# Patient Record
Sex: FEMALE | Race: OTHER
[De-identification: names, ages, dates, MRNs, and addresses within clinical notes are randomized per-mention and may not be internally consistent; named-entity substitution may affect disease eponyms.]

---

## 2017-03-07 ENCOUNTER — HOSPITAL ENCOUNTER (INPATIENT)
Dept: HOSPITAL 61 - ER | Age: 72
LOS: 3 days | Discharge: HOME | DRG: 189 | End: 2017-03-10
Attending: FAMILY MEDICINE | Admitting: FAMILY MEDICINE
Payer: MEDICARE

## 2017-03-07 VITALS — SYSTOLIC BLOOD PRESSURE: 165 MMHG | DIASTOLIC BLOOD PRESSURE: 77 MMHG

## 2017-03-07 VITALS — SYSTOLIC BLOOD PRESSURE: 156 MMHG | DIASTOLIC BLOOD PRESSURE: 85 MMHG

## 2017-03-07 VITALS — HEIGHT: 59 IN | BODY MASS INDEX: 38.91 KG/M2 | WEIGHT: 193 LBS

## 2017-03-07 VITALS — DIASTOLIC BLOOD PRESSURE: 68 MMHG | SYSTOLIC BLOOD PRESSURE: 140 MMHG

## 2017-03-07 DIAGNOSIS — Z90.49: ICD-10-CM

## 2017-03-07 DIAGNOSIS — J20.9: ICD-10-CM

## 2017-03-07 DIAGNOSIS — E11.65: ICD-10-CM

## 2017-03-07 DIAGNOSIS — K21.9: ICD-10-CM

## 2017-03-07 DIAGNOSIS — Z79.899: ICD-10-CM

## 2017-03-07 DIAGNOSIS — J96.01: Primary | ICD-10-CM

## 2017-03-07 DIAGNOSIS — Z79.82: ICD-10-CM

## 2017-03-07 DIAGNOSIS — E66.01: ICD-10-CM

## 2017-03-07 DIAGNOSIS — J45.901: ICD-10-CM

## 2017-03-07 DIAGNOSIS — E03.9: ICD-10-CM

## 2017-03-07 DIAGNOSIS — Z88.0: ICD-10-CM

## 2017-03-07 DIAGNOSIS — Z87.891: ICD-10-CM

## 2017-03-07 DIAGNOSIS — I10: ICD-10-CM

## 2017-03-07 DIAGNOSIS — Z88.8: ICD-10-CM

## 2017-03-07 DIAGNOSIS — G47.33: ICD-10-CM

## 2017-03-07 DIAGNOSIS — I70.0: ICD-10-CM

## 2017-03-07 LAB
ALBUMIN SERPL-MCNC: 3.5 G/DL (ref 3.4–5)
ALBUMIN/GLOB SERPL: 0.7 {RATIO} (ref 1–1.7)
ALP SERPL-CCNC: 98 U/L (ref 46–116)
ALT SERPL-CCNC: 33 U/L (ref 14–59)
ANION GAP SERPL CALC-SCNC: 14 MMOL/L (ref 6–14)
AST SERPL-CCNC: 25 U/L (ref 15–37)
BACTERIA #/AREA URNS HPF: (no result) /HPF
BASOPHILS # BLD AUTO: 0 X10^3/UL (ref 0–0.2)
BASOPHILS NFR BLD: 0 % (ref 0–3)
BILIRUB SERPL-MCNC: 0.3 MG/DL (ref 0.2–1)
BILIRUB UR QL STRIP: NEGATIVE
BUN SERPL-MCNC: 17 MG/DL (ref 7–20)
BUN/CREAT SERPL: 21 (ref 6–20)
CALCIUM SERPL-MCNC: 9.2 MG/DL (ref 8.5–10.1)
CHLORIDE SERPL-SCNC: 104 MMOL/L (ref 98–107)
CK SERPL-CCNC: 98 U/L (ref 26–192)
CKMB INDEX: 1.3 % (ref 0–4)
CKMB MASS: 1.3 NG/ML (ref 0–3.6)
CO2 SERPL-SCNC: 23 MMOL/L (ref 21–32)
CREAT SERPL-MCNC: 0.8 MG/DL (ref 0.6–1)
EOSINOPHIL NFR BLD: 1 % (ref 0–3)
ERYTHROCYTE [DISTWIDTH] IN BLOOD BY AUTOMATED COUNT: 14.3 % (ref 11.5–14.5)
GFR SERPLBLD BASED ON 1.73 SQ M-ARVRAT: 70.7 ML/MIN
GLOBULIN SER-MCNC: 4.7 G/DL (ref 2.2–3.8)
GLUCOSE SERPL-MCNC: 193 MG/DL (ref 70–99)
GLUCOSE UR STRIP-MCNC: NEGATIVE MG/DL
HCT VFR BLD CALC: 40.9 % (ref 36–47)
HGB BLD-MCNC: 13.3 G/DL (ref 12–15.5)
LYMPHOCYTES # BLD: 1.2 X10^3/UL (ref 1–4.8)
LYMPHOCYTES NFR BLD AUTO: 15 % (ref 24–48)
MCH RBC QN AUTO: 27 PG (ref 25–35)
MCHC RBC AUTO-ENTMCNC: 33 G/DL (ref 31–37)
MCV RBC AUTO: 84 FL (ref 79–100)
MONOCYTES NFR BLD: 3 % (ref 0–9)
NEUTROPHILS NFR BLD AUTO: 80 % (ref 31–73)
NITRITE UR QL STRIP: NEGATIVE
OBC FLU: (no result)
PH UR STRIP: 5.5 [PH]
PLATELET # BLD AUTO: 195 X10^3/UL (ref 140–400)
POTASSIUM SERPL-SCNC: 4.4 MMOL/L (ref 3.5–5.1)
PROT SERPL-MCNC: 8.2 G/DL (ref 6.4–8.2)
PROT UR STRIP-MCNC: 100 MG/DL
RBC # BLD AUTO: 4.87 X10^6/UL (ref 3.5–5.4)
RBC #/AREA URNS HPF: (no result) /HPF (ref 0–2)
SODIUM SERPL-SCNC: 141 MMOL/L (ref 136–145)
SP GR UR STRIP: 1.01
SQUAMOUS #/AREA URNS LPF: (no result) /LPF
UROBILINOGEN UR-MCNC: 0.2 MG/DL
WBC # BLD AUTO: 7.9 X10^3/UL (ref 4–11)
WBC #/AREA URNS HPF: (no result) /HPF (ref 0–4)

## 2017-03-07 PROCEDURE — 83880 ASSAY OF NATRIURETIC PEPTIDE: CPT

## 2017-03-07 PROCEDURE — 36415 COLL VENOUS BLD VENIPUNCTURE: CPT

## 2017-03-07 PROCEDURE — 96374 THER/PROPH/DIAG INJ IV PUSH: CPT

## 2017-03-07 PROCEDURE — 83036 HEMOGLOBIN GLYCOSYLATED A1C: CPT

## 2017-03-07 PROCEDURE — 84484 ASSAY OF TROPONIN QUANT: CPT

## 2017-03-07 PROCEDURE — 85027 COMPLETE CBC AUTOMATED: CPT

## 2017-03-07 PROCEDURE — 80053 COMPREHEN METABOLIC PANEL: CPT

## 2017-03-07 PROCEDURE — 82947 ASSAY GLUCOSE BLOOD QUANT: CPT

## 2017-03-07 PROCEDURE — 94640 AIRWAY INHALATION TREATMENT: CPT

## 2017-03-07 PROCEDURE — 93005 ELECTROCARDIOGRAM TRACING: CPT

## 2017-03-07 PROCEDURE — 81001 URINALYSIS AUTO W/SCOPE: CPT

## 2017-03-07 PROCEDURE — 82553 CREATINE MB FRACTION: CPT

## 2017-03-07 PROCEDURE — 87804 INFLUENZA ASSAY W/OPTIC: CPT

## 2017-03-07 PROCEDURE — 71020: CPT

## 2017-03-07 PROCEDURE — 80048 BASIC METABOLIC PNL TOTAL CA: CPT

## 2017-03-07 PROCEDURE — 94760 N-INVAS EAR/PLS OXIMETRY 1: CPT

## 2017-03-07 PROCEDURE — 94250: CPT

## 2017-03-07 RX ADMIN — AMLODIPINE BESYLATE SCH MG: 5 TABLET ORAL at 21:00

## 2017-03-07 RX ADMIN — INSULIN ASPART SCH UNITS: 100 INJECTION, SOLUTION INTRAVENOUS; SUBCUTANEOUS at 18:37

## 2017-03-07 RX ADMIN — METHYLPREDNISOLONE SODIUM SUCCINATE SCH MG: 40 INJECTION, POWDER, FOR SOLUTION INTRAMUSCULAR; INTRAVENOUS at 18:03

## 2017-03-07 RX ADMIN — LISINOPRIL SCH MG: 20 TABLET ORAL at 21:00

## 2017-03-07 RX ADMIN — BACITRACIN SCH MLS/HR: 5000 INJECTION, POWDER, FOR SOLUTION INTRAMUSCULAR at 18:03

## 2017-03-07 RX ADMIN — IPRATROPIUM BROMIDE AND ALBUTEROL SULFATE SCH ML: .5; 3 SOLUTION RESPIRATORY (INHALATION) at 20:29

## 2017-03-07 NOTE — EKG
[facility]

              [address] [place] [address]

Test Date:    2017               Test Time:    12:48:59

Pat Name:     MINI MAHARAJ            Department:   

Patient ID:   [id number]           Room:          

Gender:       F                        Technician:   

:          1945               Requested By: LYNNETTE CEDILLO

Order Number: [id number]            Reading MD:     

                                 Measurements

Intervals                              Axis          

Rate:         85                       P:            49

NY:           150                      QRS:          74

QRSD:         104                      T:            58

QT:           364                                    

QTc:          439                                    

                           Interpretive Statements

SINUS RHYTHM

QRS(T) CONTOUR ABNORMALITY

CONSIDER ANTEROLATERAL MYOCARDIAL DAMAGE

RI6.01          Unconfirmed report

No previous ECG available for comparison

## 2017-03-07 NOTE — [facility]
Admission Forms Criteria


                  GENERAL ADMISSION CRITERIA


 


                                                                               

   (Place 'X' for any and all applicable criteria):





Admission is indicated for ANY ONE of the following:





[ ]I.     Hemodynamic instability as indicated by ANY ONE of the following(1)(2)

(3)(4)(5):


          [ ]a) Vital sign abnormality not readily corrected by appropriate 

treatment within 12 to 24 hours indicated by ANY ONE of the following: 


                 [ ]i)    Hypotension


                 [ ]ii)   Symptomatic Tachycardia unresponsive to treatment (eg

, analgesia, fluids, sedation as indicated)


                 [ ]iii)  Orthostatic vital sign changes unresponsive to 

treatment (eg, fluids)


          [ ]b) Vital sign abnormality that is severe indicated by ANY ONE of 

the following:


                 [ ]i)    Inadequate perfusion indicated by ANY ONE of the 

following:


                          [ ]1)   Lactic acidosis (greater than 2 mmol/L)


                          [ ]2)   New abnormal capillary refill (greater than 3 

seconds)


                          [ ]3)   Other metabolic acidosis (arterial pH less 

than 7.35) not otherwise explained


                          [ ]4)   Reduced urine output


                          [ ]5)   Altered mental status


                          [ ]6)   Myocardial Ischemia


                 [ ]v)    Mean arterial pressure[A] less than 60 mm Hg


                 [ ]vi)   Mean arterial pressure[A] less than 70 mm Hg after 30 

minutes of appropriate treatment (eg, fluid resuscitation)


                 [ ]vii)  IV inotropic or vasopressor medication required to 

maintain adequate blood pressure or perfusion


                 [ ]viii) Sustained heart rate greater than 120 beats per 

minute in adult or child 6 years or older[B]]


[ ]II.    Hypertension requiring inpatient treatment as indicated by ANY ONE of 

the following(6)(7)(8):


                 [ ]a)  SBP greater than 220 mm Hg or DBP greater than 120 mm 

Hg despite treatment


                 [ ]b)  SBP greater than 140 mm Hg or DBP greater than 100 mm 

Hg with evidence of acute end organ 


                         damage as indicated by ANY ONE of the following:


                         [ ]i)    Encephalopathy


                         [ ]ii)   Acute renal failure as indicated by new onset 

of ANY ONE of the following(9)(10)(11)(12)(13):


                                  [ ]1)  A 3-fold rise in serum creatinine from 

baseline


                                  [ ]2) Serum creatinine greater than 4 mg/dL (

354 micromoles/L) with acute rise greater than 0.5 mg/dL (44.2 micromoles/L)


                                  [ ]3)  Reduction of more than 75% in 

estimated glomerular filtration rate from baseline


                                  [ ]4) Estimated glomerular filtration rate 

less than 35 mL/min/1.73m2 (0.59 mL/sec/1.73m2) in child up to 18 years of age


                                  [ ]5) Cessation of urine output indicated by 

ALL of the following: 


                                        [ ]A.   Adequate volume status


                                        [ ]B.   Inadequate urine output as 

indicated by ANY ONE of the following:


                                                [ ]a.     Urine output less 

than 0.3 mL/kg/hr for 24 hours


                                                [ ]b.     Anuria (urine output 

less than 0.1 mL/kg/hr) for 12 hours 


                        [ ]iii)  Aortic dissection


                        [ ]iv)  Myocardial ischemia


                        [ ]v)   Left ventricular heart failure 


                        [ ]vi)  Retinal hemorrhage


                        [ ]vii) Other significant finding 


                 [ ]c)  Hypertension in child requiring inpatient treatment as 

indicated by ALL of the following(14)(15)(16):


                        [ ]i)    Outpatient treatment not effective, not 

available, or not appropriate 


                        [ ]ii)   SBP or DBP greater than 95th percentile for age


                        [ ]iii)  Evidence of acute end organ damage as 

indicated by ANY ONE of the following:


                                 [ ]1)   Altered mental status


                                 [ ]2)   Acute renal failure as indicated by 

new onset of ANY ONE of the following(9)(10)(11)(12)(13):


                                        [ ]A.    A 3-fold rise in serum 

creatinine from baseline


                                        [ ]B.    Serum creatinine greater than 

4 mg/dL (354 micromoles/L) with acute rise greater than 0.5 mg/dL (44.2 

micromoles/L)


                                        [ ]C.    Reduction of more than 75% in 

estimated glomerular filtration rate from baseline


                                        [ ]D.    Estimated glomerular 

filtration rate less than 35 mL/min/1.73m2 (0.59 mL/sec/1.73m2)in child up to 

18 years of age


                                        [ ]E.    Cessation of urine output 

indicated by ALL of the following:


                                                  [ ]a.   Adequate volume status


                                                  [ ]b.   Inadequate urine 

output as indicated by ANY ONE of the following:


                                                           [ ]1)  Urine output 

less than 0.3 mL/kg/hr for 24 hours 


                                                           [ ]2)  Anuria (urine 

output less than 0.1 mL/kg/hr) for 12 hours


                                                           [ ]3)  Severe 

headache


                                                           [ ]4)  Visual 

disturbance


                                                           [ ]5)  Retinal 

hemorrhage


                                                           [ ]6)  Other 

significant finding


[ ]III.   Acute cardiac or peripheral ischemia as indicated by ANY ONE of the 

following:


          [ ]a)  Acute coronary syndrome(17)(18)


          [ ]b)  Acute peripheral ischemia (eg, pulseless, cool, mottled, or 

cyanotic extremity)(19) 


[ ]IV.     Cardiac arrhythmias or findings of immediate concern indicated by 

ANY ONE of the following(20)(21):


           [ ]a)  Heart rhythms that are inherently dangerous or unstable 

indicated by ANY ONE of the following(22)(23)(24):


                  [ ]i)    Resuscitated ventricular fibrillation or cardiac 

arrest 


                  [ ]ii)   Ventricular escape rhythm


                  [ ]iii)  Sustained ventricular tachycardia (30 seconds or 

more of ventricular rhythm at greater than 100 beats per minute)


                  [ ]iv)  Nonsustained ventricular tachycardia and ANY ONE of 

the following:


                          [ ]1)   Suspected cardiac ischemia as cause or 

consequence of ventricular tachycardia


                          [ ]2)   In setting of acute myocarditis


           [ ]b)  Unstable cardiac conduction defects indicated by ANY ONE of 

the following(24)(25)(26):


                  [ ]i)    Type II second-degree atrioventricular block 


                  [ ]ii)   Third-degree atrioventricular block


                  [ ]iii)  New-onset left bundle branch block with suspected 

myocardial ischemia


           [ ]c)  Any heart rhythm and ANY ONE of the following(22)(23)(27)(28)(

29):


                  [ ] i)    Continuous long-term ECG monitoring needed (eg, 

initiation of drug requiring monitoring for more than 24 hours)


                  [ ] ii)   Patient has automatic implanted cardioverter 

defibrillator that is repeatedly firing, malfunctioning, 


                            or in need of immediate adjustment of settings 

beyond the scope of ambulatory or observation care. 


           [ ]d)  Heart rhythms of concern due to ANY ONE of the following:


                  [ ]i)    Hypotension


                  [ ]ii)   Respiratory distress


                  [ ]iii)  Association with other significant symptoms (eg, 

bradycardia with syncope or ongoing dizziness, 


                          supraventricular tachycardia with chest pain) (27)(28)

(30)


[ ] V.          Severe heart failure as indicated by ANY ONE of the following (

31)(32):


            [ ]a)  Respiratory distress 


            [ ]b)  Hypotension


            [ ]c)  Anasarca (refractory to outpatient therapy) 


            [ ]d)  Cardiac arrhythmias of immediate concern 


            [ ]e)  Myocardial ischemia


[X]VI.     Respiratory abnormalities, including ANY ONE of the following(33)(34)

(35)(36):


           [ ]a)  Respiratory rate greater than 30 breaths per minute 

unresponsive to treatment [A]


           [X]b)  New saturation of arterial oxygen less than 90%


           [ ]c)  New partial pressure of carbon dioxide greater than 44 mm Hg (

5.9 kPa)


           [ ]d)  Supplemental oxygen or respiratory treatments needed that are 

new or not performable at other levels of care


           [ ]e)  New-onset cyanosis


           [ ]f)   Inability to protect airway


           [ ]g)  Chronic lung disease with severe deterioration (not 

responsive to emergency and observation care treatment as 


                   appropriate) as indicated by ANY ONE of the  following(34)(36

):


                     [ ]i)    SaO2 5% below baseline in patient with chronic 

hypoxemia


                     [ ]ii)   New requirement for supplemental oxygen to keep 

SaO2 at baseline or acceptable level


                     [ ]iii)  Required supplemental oxygen performable only in 

acute inpatient setting


                     [ ]iv)   Severe airflow or ventilation abnormalities


                     [ ]v)    Previously mobile patient unable to walk between 

rooms 


                     [ ]vi    Inability to eat or sleep due to dyspnea


                     [ ]vii)  Rapid rate of exacerbation onset 


                     [ ]viii) Altered mental status


 ]VII.  Severe airflow or ventilation abnormalities (not responsive to 

emergency and observation care treatment as appropriate) as 


         indicated by ANY  ONE of the following(33)(34)(35)(37):


          [ ]a)  PCO2 greater than 42 mm Hg (5.6 kPa) and pH less than 7.35 (new

)


          [ ]b)  Documented PCO2 increased more than 5 mm Hg (0.7 kPa) from 

disease baseline


          [ ]c)  Airflow measurements [B] less than 60% of previous best or 

predicted (eg, peak expiratory flow rate less than 300 L/minute)


                  despite intensive emergent treatment [C]


          [ ]d)  Required respiratory treatments that are performable only in 

acute inpatient setting


[ ]VIII.  Impending or actual respiratory arrest  ( Also use Respiratory 

Failure GRG  for severe respiratory disease and


          long-term mechanical ventilation patients)


[ ]IX.    Neurologic abnormalities, including ANY ONE of the following:


          [ ]a)  New findings that suggest ANY ONE of the following:


                 [ ]i)    CNS infection(38)


                 [ ]ii)   Cerebral bleeding, ischemia, or vasospasm(39)(40)


                 [ ]iii)  Increased intracranial pressure, hydrocephalus, or 

cerebral edema(41)(42)(43)


                 [ ]iv)  Spinal cord injury(44)


         [ ]b)  Uncontrolled seizures(45)


         [ ]c)  New-onset coma (eg, [place] coma scale score less than 9) or 

unexplained abnormal mental status 


                (eg, [place] coma scale score less than 14) [D](41)(46)(47)


[ ]X.   New-onset severe neurologic findings requiring inpatient care; examples 

include(42)(48)(49):


         [ ]a)  Papilledema


         [ ]b)  Cerebral edema


         [ ]c)  Mass effect on CT scan


[ ]XI.    Suspected acute intra-abdominal process with peritoneal signs, 

abdominal mass, or similar findings (50)(51)(52)


[ ]XII.   Severe physiologic disorder remaining after emergency or observation 

level care (as appropriate) as indicated by 


         ANY ONE of the following (53):


         [ ]a)  Significant dehydration


         [ ]b)  Diabetic ketoacidosis


         [ ]c)  Hyperglycemic hyperosmolar state (eg, osmolality greater than 

320 mOsm/kg (mmol/kg)


         [ ]d)  Hypoglycemia


         [ ]e)  Other (new) acid-base disorder with pH less than 7.35 or 

greater than 7.5(54)


         [ ]f)  Thyroid storm (55)


         [ ]g)  Myxedema coma (55)


[ ]XIII.  Abdominal abnormalities with ANY ONE of the following(56)(57):


         [ ]a)  Absent bowel sounds with complete ileus


         [ ]b)  Signs of intestinal obstruction or peritonitis [E]


         [ ]c)  Nausea and vomiting that cannot be controlled with outpatient 

or observation care


[ ]XIV. Acute renal failure as indicated by new onset of ANY ONE of the 

following(9)(10)(11)(12)(13):


          [ ]a)  A 3-fold rise in serum creatinine from baseline


          [ ]b)  Serum creatinine greater than 4 mg/dL (354 micromoles/L) with 

acute rise greater than 0.5 mg/dL (44.2 micromoles/L)


          [ ]c)  Reduction of more than 75% in estimated glomerular filtration 

rate from baseline


          [ ]d)  Estimated glomerular filtration rate less than 35 mL/min/

1.73m2 (0.59 mL/sec/1.73m2) in child up to 18 years of age


          [ ]e)  Cessation of urine output indicated by ALL of the following:


                 [ ]i)    Adequate volume status


                 [ ]ii)   Inadequate urine output as indicated by ANY ONE of 

the following:


                         [ ]1)   Urine output less than 0.3 mL/kg/hr for 24 

hours


                         [ ]2)   Anuria (urine output less than 0.1 mL/kg/hr) 

for 12 hours


[ ]XV.  Significant uremic complications as indicated by ANY ONE of the 

following(58)(59)(60):


          [ ]a)  Outpatient therapy is ineffective or not feasible for ANY ONE 

of the following:


                 [ ]i)    Severe heart failure 


                 [ ]ii)   Severehypertension 


                 [ ]iii)  Pleural effusion


                 [ ]iv)  Pericarditis or pericardial effusion


           [ ]b)  Cardiac arrhythmias of immediate concern     


           [ ]c)  Intractable nausea or vomiting


           [ ]d)  Recurrent seizures 


           [ ]e)  Encephalopathy


           [ ]f)   Bleeding abnormalities (eg, platelet dysfunction) with 

active (eg, gastrointestinal) bleeding 


           [ ]g)  Dialysis indicated before long-term access or ambulatory 

arrangements can be made


           [ ]h)  Significant metabolic or electrolyte abnormalities (eg, 

severe acidosis or hyperkalemia)


[ ]XVI.  High fever or other high-risk infection situation as indicated by ANY 

ONE of the following(61)(62)(63)(64):


           [ ]a)  Outpatient and observation care antimicrobial treatment 

unavailable, not effective, or not appropriate 


           [ ]b)  Documented bacteremia


           [ ]c)  Temperature greater than 40.5 degrees C (104.9 degrees F) (

oral)


           [ ]d)  Temperature greater than 39.5 degrees C (103.1 degrees F) (

oral) or less than 36 degrees C (96.8 degrees F)


                   (rectal) that does not respond to e  treatment and 

observation care


[ ] XVII.  Temperature less than 95 degrees F (35 degrees C)(rectal)(65)


[ ] XVIII. Severe nutritional abnormalities as indicated by ALL of the following

(66)(67):


           [ ]a)  Inability to tolerate or establish sufficient oral or other 

enteral nutrition in outpatient setting 


           [ ]b)  Parenteral nutrition regimen need that must be implemented on 

inpatient basis


[ ] XIX.  Severe electrolyte abnormalities indicated by ALL of the following(68)

(69)(70):


           [ ]a)  Electrolytes and associated findings are not as expected for 

patient baseline or acceptable treatment effects.


           [ ]b)  Severe abnormalities indicated by ANY ONE of the following:


                  [ ]i)  Sodium less than 130 mEq/L (mmol/L) (new)


                  [ ]ii)Sodium less than 135 mEq/L (mmol/L) with ANY ONE of the 

following:


                        [ ]1)   Uncorrectable (to near normal or chronic 

baseline) after trial of outpatient and emergency treatment


                        [ ]2)   Altered mental status


                        [ ]3)   Seizures


                        [ ]4)   Severe medical etiology requiring inpatient 

management (eg, heart failure, hypovolemia)                 


                  [ ]iii)  Sodium greater than 155 mEq/L (mmol/L)


                  [ ]iv)  Sodium greater than 150 mEq/L (mmol/L) with ANY ONE 

of the following:


                        [ ]1)   Uncorrectable (to near normal or chronic 

baseline) with outpatient and emergency treatment


                        [ ]2)   Altered mental status


                        [ ]3)   Seizures


                        [ ]4)   Severe medical etiology (eg, hypovolemia, 

diabetes insipidus)


                  [ ]v)   Potassium less than 2.5 mEq/L (mmol/L) despite 

outpatient and emergency treatment 


                  [ ]vi)  Potassium less than 3 mEq/L (mmol/L) with ANY ONE of 

the following:


                         [ ]1)   Weakness


                         [ ]2)   Cardiac abnormality (eg, arrhythmia, 

conduction disturbance)


                         [ ]3)   Cardiac ischemia


                         [ ]4)   Ileus


                         [ ]5)   Ongoing medical cause requiring inpatient 

management (eg, acute renal wasting or SIADH)


                         [ ]6)   Other severe symptoms                 


                 [ ]vii) Potassium greater than 6.5 mEq/L (mmol/L)


                 [ ]viii) Potassium greater than 5 mEq/L (mmol/L) with ANY ONE 

of the following:


                        [ ]1)   Uncorrectable (to near normal or chronic 

baseline) with outpatient and emergency treatment


                        [ ]2)   Severe ECG findings [F]


                        [ ]3)   Acute worsening of renal failure (creatinine 

greater than 2.5 mg/dL (221 micromoles/L) or significant elevation for age and 

size)


                        [ ]4)   Severe weakness


                        [ ]5)   Severe medical etiology (eg, hemolysis, 

infection, drug overdose)


                 [ ]ix)  Calcium less than 7 mg/dL (1.75 mmol/L) despite 

outpatient and emergency treatment (72)


                 [ ]x)  Calcium less than 8 mg/dL (2 mmol/L) with significant 

symptoms or findings; examples include(72):                       


                         [ ]1)   Altered mental status


                         [ ]2)   Muscle spasms


                         [ ]3)   Seizures


                         [ ]4)   Breathing difficulty


                         [ ]5)   Cardiac abnormality (eg, arrhythmia or 

conduction disturbance)


                [ ]xi)  Calcium greater than 14 mg/dL (3.5 mmol/L)(72)


                [ ]xii) Calcium greater than 12 mg/dL (3 mmol/L) with ANY ONE 

of the following(72):


                         [ ]1)   Uncorrectable (to near normal or chronic 

baseline) with outpatient and emergency treatment


                         [ ]2)   Significant dehydration or hypovolemia as 

indicated by ALL of the following(70)(73)(74):


                                  [ ]A.  Not resolved with initial treatments


                                  [ ]B.  Clinically significant dehydration as 

indicated by ANY ONE of the following:


                                           [ ]a. Vomiting refractory to 

outpatient treatment (ie, precluding oral rehydration)


                                           [ ]b. Inability to drink


                                           [ ]c. Hypernatremia or other 

electrolyte abnormality unable to be corrected with outpatient and emergency 

treatment


                                           [ ]d. Failure to remain hydrated 

with outpatient therapy 


                                           [ ]e. Reduced urine output


                                           [ ]f. Hypotension


                                           [ ]g. Serious cause for dehydration 

requiring acute hospitalization (eg, bowel obstruction, increased 


                                                  intracranial pressure, 

infectious cause)


                                           [ ]h. Child with ANY ONE of the 

following(75):


                                                  [ ]1)  Severe abdominal 

tenderness


                                                  [ ]2)  Adequate care not 

available at home     


                                                  [ ]3)  Severe dehydration (

greater than 9% loss of body weight)


                                                  [ ]4)  Significant symptoms 

or findings; examples include: 


                                                          [ ]A. Altered mental 

status


                                                          [ ]B. Cardiac 

abnormality (eg, arrhythmia, conduction disturbance) 


                                                          [ ]C. Malignant 

etiology requiring inpatient treatment


                [ ]xiii)  Phosphorus less than 1 mg/dL (0.32 mmol/L)


                [ ]xiv)  Phosphorus less than 1.5 mg/dL (0.48 mmol/L) with ANY 

ONE of the following:


                          [ ]1)   Patient unresponsive to outpatient and 

emergency treatment


                          [ ]2)   Significant symptoms or findings; examples 

include: 


                                  [ ]A.  Weakness


                                  [ ]B. Altered mental status 


                                  [ ]C. Breathing difficulty 


                                  [ ]D. Seizures


                                  [ ]E. Rhabdomyolysis


                [ ]xv)   Phosphorus greater than 10 mg/dL (3.2 mmol/L)


                [ ]xvi)  Phosphorus greater than 4.5 mg/dL (1.45 mmol/L) (new) 

with ANY ONE of the following:


                          [ ]1)   Severe medical etiology (eg, crush injury, 

acute renal failure)


                          [ ]2)   Associated hypocalcemia with significant 

findings; examples include: 


                                  [ ]A.    Neurologic symptoms


                                  [ ]B.    Altered mental status


                                  [ ]C.    Muscle spasms


                                  [ ]D.    Seizures


                                  [ ]E.    Breathing difficulty


                                  [ ]F.    Cardiac abnormality (eg, arrhythmia, 

conduction disturbance)


                [ ]xvii)   Magnesium less than 1 mg/dL (0.41 mmol/L)


                [ ]xviii)  Magnesium less than 1.5 mg/dL (0.62 mmol/L) with ANY 

ONE of the following:


                           [ ]1)   Patient unresponsive to outpatient and 

emergency treatment


                           [ ]2)   Associated hypocalcemia with significant 

findings; examples include: 


                                   [ ]A.    Altered mental status


                                   [ ]B.    Muscle spasms


                                   [ ]C.    Seizures


                                   [ ]D.    Breathing difficulty


                                   [ ]E.    Cardiac abnormality (eg, arrhythmia

, conduction disturbance)


                           [ ]3)   Associated hypokalemia (potassium less than 

3 mEq/L (mmol/L)) with risk of arrhythmia 


                [ ]xix)  Magnesium greater than 4 mEq/L (2 mmol/L) 


                [ ]xx)   Magnesium greater than 2.5 mEq/L (1.25 mmol/L) with 

significant symptoms or findings; examples include:


                          [ ]1)   Weakness


                          [ ]2)   Altered mental status


                          [ ]3)   Cardiac abnormality (eg, arrhythmia, 

conduction disturbance)


                          [ ]4)   Breathing difficulty


                          [ ]5)   Severe medical etiology (eg, renal failure, 

hypovolemia)


               [ ]xxi)   Uric acid greater than 20 mg/dL (1190 micromoles/L)(76)


               [ ]xxii)  Uric acid greater than 8 mg/dL (476 micromoles/L) with 

significant symptoms or findings of tumor


                          lysis syndrome; examples include(76):


                          [ ]1)   Creatinine greater than 1.5 times upper limit 

of normal


                          [ ]2)   Cardiac abnormality (eg, arrhythmia, 

conduction disturbance)


                          [ ]3)   Seizure


[ ]XX.   Acute blood loss causing significant abnormality as indicated by ANY 

ONE of the following(77)(78):


         [ ]a)  Hemoglobin less than 10 g/dL (100 g/L) (not baseline)


         [ ]b)  Hematocrit less than 30% (0.30) (not baseline)


         [ ]c)  Repeat hematocrit decreased more than 2% (0.02)


         [ ]d)  Uncontrolled bleeding


[ ]XXI. Severe anemia indicated by ANY ONE of the following(78)(79):


         [ ]a)  Altered mental status 


         [ ]b)  Chest pain


         [ ]c)  Exertional dyspnea 


         [ ]d)  Syncope


         [ ]e)  Other findings suggesting inadequate perfusion


         [ ]f)   Treatment with transfusion or volume replacement is 

ineffective at resolving ANY ONE of the following [G]:


                 [ ]i)    Tachycardia for age


                 [ ]ii)   Orthostatic vital sign changes as indicated by ANY ONE

 of the following(80):


                         [ ]1)    Fall in SBP of 20 mm Hg or more 1 to 3 

minutes after patient sits or stands from recumbent position


                         [ ]2)    Fall in DBP of 10 mm Hg or more 1 to 3 

minutes after patient sits or stands from recumbent position


[ ]XXII. High-risk low platelet count as indicated by ANY ONE of the following(

81)(82):


          [ ]a)  Severe or life-threatening bleeding (eg, intracranial, major 

gastrointestinal, or extensive mucosal bleeding),


                  with any reduced platelet count


          [ ]b)  Platelet count less than 20,000/mm3 (20 x109/L) with any 

active bleeding


          [ ]c)  Platelet count less than 10,000/mm3 (10 x109/L) with minor 

purpura or petechiae 


          [ ]d)  Platelet count less than 5000/mm3 (5 x109/L)


          [ ]e)  Low platelet count with hemolytic anemia


[ ]XXIII.  Disseminated intravascular coagulation(77)(83)


[ ]XXIV. Severe adverse drug or systemic toxin reaction requiring inpatient 

treatment; examples include(84)(85):          


          [ ]a)  Serotonin syndrome(86)


          [ ]b)  Neuroleptic malignant syndrome(86)


          [ ]c)  Cholinergic syndrome with severe symptoms (eg, bronchorrhea, 

weakness, mental status changes, seizures)


          [ ]d)  Sympathetic syndrome with severe symptoms (eg, seizures, 

mental status changes, cardiac dysrhythmias)


          [ ]e)   Anticholinergic syndrome


[ ]XXV.    Severe pain requiring acute inpatient management as indicated by ALL 

of the following (87)(88)(89):        


          [ ]a)  Continuous or frequent (eg, every 2 to 4 hours) parenteral 

analgesics required [H]


          [ ]b)  Rapid improvement expected from treatment or acute 

intervention (eg, surgery, anesthesia procedure)


[ ]XXVI.Severe behavioral health issues judged unmanageable at a lower level of 

care (eg, residential) in a 


          patient who is ANY ONE of the following(91)          


          [ ]a)  Acutely suicidal


          [ ]b)  A danger to self (eg, self-mutilating or suicidal behavior)


          [ ]c)  A danger to others (eg, assaultive or homicidal behavior)


          [ ]d)   Incapacitated because of grave disability (eg, inability to 

provide for self at lower level of care) (92) 


[ ]XXVII. Inpatient monitoring needed; examples include(1)(3)(87)(93)(94)(95)(96

):


          [ ]a)  Vital signs, neurologic signs, or vascular checks more 

frequently than every 4 hours


          [ ]b)  Cardiac or respiratory monitoring beyond the scope (eg, over 

24 hours) of observation care


          [ ]c)  Pulmonary artery catheter monitoring


          [ ]d)  Suspected compartment syndrome(97) (98)


          [ ]e)  Cerebral bleeding, hydrocephalus, or vasospasm monitoring


          [ ]f)   Increased intracranial pressure or cerebral edema monitoring


          [ ]g)  Fetal monitoring


[ ]XXVIII. Treatment requiring inpatient care; examples include:


          [ ]a)  IV fluid to replace significant ongoing losses (greater than 3 

L/m2 per day)(53)


          [ ]b)  High concentration oxygen (greater than 40%)(33)(99)(100)


          [ ]c)  Frequent respiratory therapy (more frequently than every 4 

hours) to maintain airflow rates greater 


                  than 60% of baseline(33)(99)(100)


          [ ]d)  Epidural analgesia(87)


          [ ]e)  IV anticoagulation, vasoactive, or antiarrhythmic medication(19

)(23)


          [ ]f)   Acute thrombolytics (generally require 24 hours of observation

)(101)(102)


[ ]XXIX.  Emergency procedures needed; examples include:


          [ ]a)  Emergency inpatient surgery


          [ ]b)  Temporary pacemaker placement(103)


          [ ]c)  Chest tube placement with active evacuation (eg, suction, 

drainage)(104)


          [ ]d)   Emergent cardioversion(105)


          [ ]e)  Emergent cardiac or vascular procedures (eg, cardiac 

catheterization, angioplasty) (17)(18)


          [ ]f)   Emergent dialysis access placement and institution(10)(106)


          [ ]g)  Emergent pericardiocentesis(107)


          [ ]h)  Emergent plasmapheresis or leukapheresis(83)


          [ ]i)   Emergent tracheostomy








The original [employer] content created by [employer] has been revised. 


The portions of the content which have been revised are identified through the 

use of italic text or in bold, and [facility] 


has neither reviewed nor approved the modified material. All other unmodified 

content is copyright  [employer].





Please see references footnoted in the original [employer] edition 

2016


Admission Criteria Met?:  Yes








TATI WEAVER Mar 7, 2017 18:10

## 2017-03-07 NOTE — PHYS DOC
Past Medical History


Past Medical History:  GERD, Hypertension, Hypothyroid


Additional Past Medical Histor:  bipap at HS


Past Surgical History:  Appendectomy, Cholecystectomy


Alcohol Use:  None


Drug Use:  None





Adult General


Chief Complaint


Chief Complaint:  COUGH





HPI


HPI


Patient is a 71  year old female who presents with complaint of shortness of 

breath and cough. Patient has been having symptoms for the past 2 days. Patient 

went to see her primary physician yesterday and was treated for bronchitis. 

Patient started on Levaquin and prednisone. Patient awoke with worsening 

symptoms today including headaches, subjective fever, chills, bodyaches, and 

productive cough. Patient states that her cough is productive of yellow sputum. 

Patient rates her discomfort currently is 8 out of 10. Patient states that she 

has had worsening dyspnea on exertion. Patient denies any history of COPD. 

Patient denies any nausea, vomiting, or diarrhea.





Review of Systems


Review of Systems





Constitutional: Fever, chills, bodyaches []


Eyes: Denies change in visual acuity, redness, or eye pain []


HENT: Denies nasal congestion or sore throat []


Respiratory: Productive cough, shortness of breath []


Cardiovascular: Denies chest pain or edema []


GI: Denies abdominal pain, nausea, vomiting, bloody stools or diarrhea []


: Denies dysuria or hematuria []


Musculoskeletal: Myalgias, back pain []


Integument: Denies rash or skin lesions []


Neurologic: Headache, denies focal weakness or sensory changes []





Current Medications


Current Medications





 Current Medications








 Medications


  (Trade)  Dose


 Ordered  Sig/Jos  Start Time


 Stop Time Status Last Admin


Dose Admin


 


 Albuterol/


 Ipratropium


  (Duoneb)  6 ml  1X  ONCE  3/7/17 13:00


 3/7/17 13:01 DC 3/7/17 12:55


6 ML











Allergies


Allergies





 Allergies








Coded Allergies Type Severity Reaction Last Updated Verified


 


  Iodinated Contrast Media - Oral and Allergy Intermediate  14 Yes


 


  Penicillins Allergy Intermediate Rash 14 Yes


 


  acetaminophen Allergy Intermediate  16 Yes











Physical Exam


Physical Exam





Constitutional: Alert, afebrile, appears in moderate discomfort. []


HENT: Normocephalic, atraumatic, bilateral external ears normal, oropharynx 

moist, no oral exudates, nose normal. []


Eyes: PERRLA, EOMI, conjunctiva normal, no discharge. [] 


Neck: Normal range of motion, no tenderness, supple, no stridor. [] 


Cardiovascular:Heart rate regular rhythm, no murmur []


Lungs & Thorax: Moderately restricted air movement bilaterally, expiratory 

wheezes bilaterally, no rales []


Abdomen: Bowel sounds normal, soft, no tenderness, no masses, no pulsatile 

masses. [] 


Skin: Warm, dry, no erythema, no rash. [] 


Back: No tenderness, no CVA tenderness. [] 


Extremities: No tenderness, no cyanosis, no clubbing, ROM intact, no edema. [] 


Neurologic: Alert and oriented X 3, normal motor function, normal sensory 

function, no focal deficits noted. []





Current Patient Data


Vital Signs





 Vital Signs








  Date Time  Temp Pulse Resp B/P Pulse Ox O2 Delivery O2 Flow Rate FiO2


 


3/7/17 13:44  88 21 184/86 93 Room Air  


 


3/7/17 12:25 98.4       





 98.4       








Lab Values





 Laboratory Tests








Test


  3/7/17


12:45 3/7/17


12:50


 


Urine Collection Type Unknown   


 


Urine Color Yellow   


 


Urine Clarity Clear   


 


Urine pH 5.5   


 


Urine Specific Gravity 1.010   


 


Urine Protein


  100mg/dL


(NEG-TRACE) 


 


 


Urine Glucose (UA)


  Negativemg/dL


(NEG) 


 


 


Urine Ketones (Stick)


  Negativemg/dL


(NEG) 


 


 


Urine Blood Large (NEG)   


 


Urine Nitrite


  Negative (NEG)


  


 


 


Urine Bilirubin


  Negative (NEG)


  


 


 


Urine Urobilinogen Dipstick


  0.2mg/dL (0.2


mg/dL) 


 


 


Urine Leukocyte Esterase


  Negative (NEG)


  


 


 


Urine RBC


  6-10/HPF (0-2)


  


 


 


Urine WBC 1-4/HPF (0-4)   


 


Urine Squamous Epithelial


Cells Few/LPF  


  


 


 


Urine Bacteria


  Few/HPF


(0-FEW) 


 


 


Influenza Type A Antigen


  Negative


(NEGATIVE) 


 


 


Influenza Type B Antigen


  Negative


(NEGATIVE) 


 


 


White Blood Count


  


  7.9x10^3/uL


(4.0-11.0)


 


Red Blood Count


  


  4.87x10^6/uL


(3.50-5.40)


 


Hemoglobin


  


  13.3g/dL


(12.0-15.5)


 


Hematocrit


  


  40.9%


(36.0-47.0)


 


Mean Corpuscular Volume  84fL ()  


 


Mean Corpuscular Hemoglobin  27pg (25-35)  


 


Mean Corpuscular Hemoglobin


Concent 


  33g/dL (31-37)


 


 


Red Cell Distribution Width


  


  14.3%


(11.5-14.5)


 


Platelet Count


  


  195x10^3/uL


(140-400)


 


Neutrophils (%) (Auto)  80% (31-73)  H


 


Lymphocytes (%) (Auto)  15% (24-48)  L


 


Monocytes (%) (Auto)  3% (0-9)  


 


Eosinophils (%) (Auto)  1% (0-3)  


 


Basophils (%) (Auto)  0% (0-3)  


 


Neutrophils # (Auto)


  


  6.3x10^3uL


(1.8-7.7)


 


Lymphocytes # (Auto)


  


  1.2x10^3/uL


(1.0-4.8)


 


Monocytes # (Auto)


  


  0.3x10^3/uL


(0.0-1.1)


 


Eosinophils # (Auto)


  


  0.1x10^3/uL


(0.0-0.7)


 


Basophils # (Auto)


  


  0.0x10^3/uL


(0.0-0.2)


 


Sodium Level


  


  141mmol/L


(136-145)


 


Potassium Level


  


  4.4mmol/L


(3.5-5.1)


 


Chloride Level


  


  104mmol/L


()


 


Carbon Dioxide Level


  


  23mmol/L


(21-32)


 


Anion Gap  14 (6-14)  


 


Blood Urea Nitrogen


  


  17mg/dL (7-20)


 


 


Creatinine


  


  0.8mg/dL


(0.6-1.0)


 


Estimated GFR


(Cockcroft-Gault) 


  70.7  


 


 


BUN/Creatinine Ratio  21 (6-20)  H


 


Glucose Level


  


  193mg/dL


(70-99)  H


 


Calcium Level


  


  9.2mg/dL


(8.5-10.1)


 


Total Bilirubin


  


  0.3mg/dL


(0.2-1.0)


 


Aspartate Amino Transferase


(AST) 


  25U/L (15-37)  


 


 


Alanine Aminotransferase (ALT)  33U/L (14-59)  


 


Alkaline Phosphatase


  


  98U/L ()


 


 


Creatine Kinase


  


  98U/L ()


 


 


Creatine Kinase MB (Mass)


  


  1.3ng/mL


(0.0-3.6)


 


Creatine Kinase MB Relative


Index 


  1.3% (0-4)  


 


 


Troponin I Quantitative


  


  < 0.017ng/mL


(0.000-0.055)


 


NT-Pro-B-Type Natriuretic


Peptide 


  35pg/mL


(0-124)


 


Total Protein


  


  8.2g/dL


(6.4-8.2)


 


Albumin


  


  3.5g/dL


(3.4-5.0)


 


Albumin/Globulin Ratio


  


  0.7 (1.0-1.7)


L





 Laboratory Tests


3/7/17 12:50








 Laboratory Tests


3/7/17 12:50














EKG


EKG


Interpreted by me: Heart rate 85, sinus rhythm, normal intervals, normal axis 

and in no acute ST/T-wave abnormalities present []





Radiology/Procedures


Radiology/Procedures





 [facility]


 [address] [place] [address]


 [phone]


 


 IMAGING REPORT





 Signed





PATIENT: MINI MAHARAJ ACCOUNT: [id number] MRN#: [id number]


: 1945 LOCATION: ER AGE: 71


SEX: F EXAM DT: 17 ACCESSION#: [id number]


STATUS: REG ER ORD. PHYSICIAN: LYNNETTE CEDILLO MD 


REASON: cough, shortness of breath


PROCEDURE: CHEST PA & LATERAL











PA and lateral chest radiographs 3/7/2017





Clinical history: Cough for 2 days with shortness of breath.





PA and lateral digital radiographs of the chest were obtained. Comparison


study is dated 7/15/2016.





The cardiac silhouette is borderline enlarged. Atherosclerotic calcification


of the thoracic aorta is seen. The thoracic aorta is mildly tortuous.


Elevation of the right hemidiaphragm is noted. No acute pulmonary infiltrate


is seen. No pleural effusion or pneumothorax is noted. Degenerative changes


are seen involving the thoracic spine. A surgical clip is seen within the


right upper quadrant of the abdomen consistent with a cholecystectomy.





Impression: No acute abnormality is seen.














DICTATED and SIGNED BY:     RONNIE ROMANO MD


DATE:     17 2892





CC: JENNIFER MARTINEZ MD; LYNNETTE CEDILLO MD ~


[]





Course & Med Decision Making


Course & Med Decision Making


Pertinent Labs and Imaging studies reviewed. (See chart for details)





Patient was treated with DuoNeb treatments in the emergency department. On 

reevaluation the patient continues to display increased work of breathing and 

oxygen saturations were at 88%. The patient will require admission to the 

hospital for continued treatment of her reactive airway disease with hypoxia. 

Patient's chest x-ray was negative for pneumonia. I spoke with Dr. Hamilton who 

is on-call for Dr. Martinez. He accepted care of patient in hospital.





Dragon Disclaimer


Dragon Disclaimer


This electronic medical record was generated, in whole or in part, using a 

voice recognition dictation system.





Departure


Departure


Impression:  


 Primary Impression:  


 Acute respiratory distress


 Additional Impressions:  


 Hypoxia


 Diabetes mellitus


Disposition:  09 ADMITTED AS INPATIENT


Admitting Physician:  Collins Hamilton


Condition:  GUARDED


Referrals:  


JENNIFER MARTINEZ MD (PCP)





Problem Qualifiers








 Additional Impressions:  


 Diabetes mellitus


 Diabetes mellitus type:  type 2  Diabetes mellitus complication status:  with 

hyperglycemia  Diabetes mellitus long term insulin use:  unspecified long term 

insulin use status  Qualified Code:  E11.65 - Type 2 diabetes mellitus with 

hyperglycemia





LYNNETTE CEDILLO MD Mar 7, 2017 13:28

## 2017-03-07 NOTE — RAD
PA and lateral chest radiographs 3/7/2017



Clinical history: Cough for 2 days with shortness of breath.



PA and lateral digital radiographs of the chest were obtained. Comparison

study is dated 7/15/2016.



The cardiac silhouette is borderline enlarged. Atherosclerotic calcification

of the thoracic aorta is seen. The thoracic aorta is mildly tortuous.

Elevation of the right hemidiaphragm is noted. No acute pulmonary infiltrate

is seen. No pleural effusion or pneumothorax is noted. Degenerative changes

are seen involving the thoracic spine. A surgical clip is seen within the

right upper quadrant of the abdomen consistent with a cholecystectomy.



Impression: No acute abnormality is seen.

## 2017-03-08 VITALS — SYSTOLIC BLOOD PRESSURE: 189 MMHG | DIASTOLIC BLOOD PRESSURE: 82 MMHG

## 2017-03-08 VITALS — DIASTOLIC BLOOD PRESSURE: 81 MMHG | SYSTOLIC BLOOD PRESSURE: 175 MMHG

## 2017-03-08 VITALS — DIASTOLIC BLOOD PRESSURE: 70 MMHG | SYSTOLIC BLOOD PRESSURE: 159 MMHG

## 2017-03-08 VITALS — SYSTOLIC BLOOD PRESSURE: 175 MMHG | DIASTOLIC BLOOD PRESSURE: 83 MMHG

## 2017-03-08 VITALS — SYSTOLIC BLOOD PRESSURE: 176 MMHG | DIASTOLIC BLOOD PRESSURE: 68 MMHG

## 2017-03-08 VITALS — DIASTOLIC BLOOD PRESSURE: 86 MMHG | SYSTOLIC BLOOD PRESSURE: 184 MMHG

## 2017-03-08 VITALS — SYSTOLIC BLOOD PRESSURE: 180 MMHG | DIASTOLIC BLOOD PRESSURE: 81 MMHG

## 2017-03-08 LAB
ANION GAP SERPL CALC-SCNC: 9 MMOL/L (ref 6–14)
BASOPHILS # BLD AUTO: 0 X10^3/UL (ref 0–0.2)
BASOPHILS NFR BLD: 0 % (ref 0–3)
BUN SERPL-MCNC: 17 MG/DL (ref 7–20)
CALCIUM SERPL-MCNC: 8.9 MG/DL (ref 8.5–10.1)
CHLORIDE SERPL-SCNC: 104 MMOL/L (ref 98–107)
CO2 SERPL-SCNC: 24 MMOL/L (ref 21–32)
CREAT SERPL-MCNC: 0.9 MG/DL (ref 0.6–1)
EOSINOPHIL NFR BLD: 0 % (ref 0–3)
ERYTHROCYTE [DISTWIDTH] IN BLOOD BY AUTOMATED COUNT: 15 % (ref 11.5–14.5)
GFR SERPLBLD BASED ON 1.73 SQ M-ARVRAT: 61.7 ML/MIN
GLUCOSE SERPL-MCNC: 291 MG/DL (ref 70–99)
HCT VFR BLD CALC: 36.7 % (ref 36–47)
HGB BLD-MCNC: 12.1 G/DL (ref 12–15.5)
LYMPHOCYTES # BLD: 1.2 X10^3/UL (ref 1–4.8)
LYMPHOCYTES NFR BLD AUTO: 21 % (ref 24–48)
MCH RBC QN AUTO: 27 PG (ref 25–35)
MCHC RBC AUTO-ENTMCNC: 33 G/DL (ref 31–37)
MCV RBC AUTO: 82 FL (ref 79–100)
MONOCYTES NFR BLD: 3 % (ref 0–9)
NEUTROPHILS NFR BLD AUTO: 76 % (ref 31–73)
PLATELET # BLD AUTO: 194 X10^3/UL (ref 140–400)
POTASSIUM SERPL-SCNC: 4.2 MMOL/L (ref 3.5–5.1)
RBC # BLD AUTO: 4.47 X10^6/UL (ref 3.5–5.4)
SODIUM SERPL-SCNC: 137 MMOL/L (ref 136–145)
WBC # BLD AUTO: 5.4 X10^3/UL (ref 4–11)

## 2017-03-08 RX ADMIN — INSULIN ASPART SCH UNITS: 100 INJECTION, SOLUTION INTRAVENOUS; SUBCUTANEOUS at 17:26

## 2017-03-08 RX ADMIN — PANTOPRAZOLE SODIUM SCH MG: 40 TABLET, DELAYED RELEASE ORAL at 09:00

## 2017-03-08 RX ADMIN — INSULIN ASPART SCH UNITS: 100 INJECTION, SOLUTION INTRAVENOUS; SUBCUTANEOUS at 14:06

## 2017-03-08 RX ADMIN — VITAMIN D, TAB 1000IU (100/BT) SCH UNIT: 25 TAB at 09:00

## 2017-03-08 RX ADMIN — AMLODIPINE BESYLATE SCH MG: 5 TABLET ORAL at 08:59

## 2017-03-08 RX ADMIN — ASPIRIN SCH MG: 81 TABLET, COATED ORAL at 09:00

## 2017-03-08 RX ADMIN — METHYLPREDNISOLONE SODIUM SUCCINATE SCH MG: 40 INJECTION, POWDER, FOR SOLUTION INTRAMUSCULAR; INTRAVENOUS at 00:28

## 2017-03-08 RX ADMIN — LINAGLIPTIN SCH MG: 5 TABLET, FILM COATED ORAL at 08:59

## 2017-03-08 RX ADMIN — METHYLPREDNISOLONE SODIUM SUCCINATE SCH MG: 40 INJECTION, POWDER, FOR SOLUTION INTRAMUSCULAR; INTRAVENOUS at 05:21

## 2017-03-08 RX ADMIN — IPRATROPIUM BROMIDE AND ALBUTEROL SULFATE SCH ML: .5; 3 SOLUTION RESPIRATORY (INHALATION) at 07:37

## 2017-03-08 RX ADMIN — INSULIN ASPART SCH UNITS: 100 INJECTION, SOLUTION INTRAVENOUS; SUBCUTANEOUS at 09:10

## 2017-03-08 RX ADMIN — IPRATROPIUM BROMIDE AND ALBUTEROL SULFATE SCH ML: .5; 3 SOLUTION RESPIRATORY (INHALATION) at 11:40

## 2017-03-08 RX ADMIN — BACITRACIN SCH MLS/HR: 5000 INJECTION, POWDER, FOR SOLUTION INTRAMUSCULAR at 02:00

## 2017-03-08 RX ADMIN — METHYLPREDNISOLONE SODIUM SUCCINATE SCH MG: 40 INJECTION, POWDER, FOR SOLUTION INTRAMUSCULAR; INTRAVENOUS at 17:23

## 2017-03-08 RX ADMIN — IPRATROPIUM BROMIDE AND ALBUTEROL SULFATE SCH ML: .5; 3 SOLUTION RESPIRATORY (INHALATION) at 16:10

## 2017-03-08 RX ADMIN — LEVOFLOXACIN SCH MG: 500 TABLET, FILM COATED ORAL at 08:59

## 2017-03-08 RX ADMIN — LISINOPRIL SCH MG: 20 TABLET ORAL at 09:00

## 2017-03-08 RX ADMIN — IPRATROPIUM BROMIDE AND ALBUTEROL SULFATE SCH ML: .5; 3 SOLUTION RESPIRATORY (INHALATION) at 19:27

## 2017-03-08 NOTE — CONS
DATE OF CONSULTATION:  



ATTENDING PHYSICIAN:  Dr. Collins Hamilton.



REASON FOR CONSULTATION:  Cough.



HISTORY OF PRESENT ILLNESS:  The patient is a 71-year-old  female who is

morbidly obese and has obstructive sleep apnea on home CPAP.  She came to the

hospital complaining of cough, which has been occasionally productive of light

colored sputum.  This has been going on for 3-4 days.  No obvious fevers.  She

was placed on oral prednisone and oral Levaquin and she feels improved.  She was

seen in the Emergency Room as a result, she was hospitalized.  I have reviewed

the patient's chest x-ray shows chronically elevated right hemidiaphragm without

any acute infiltrates.  She states she did smoke during her teenage years for

about 25 years before quitting many years ago.  She said she was told that she

was wheezing in the ER.



PAST MEDICAL HISTORY:  Significant for history of morbid obesity and obstructive

sleep apnea on home CPAP.



PAST SURGICAL HISTORY:  No recent surgeries.



ALLERGIES:  IODINATED CONTRAST, ORAL AND IV PENICILLINS ____.



REVIEW OF SYSTEMS:  Twelve-point system obtained.  Pertinent positives discussed

in history of present illness, otherwise noncontributory.  All systems that were

negative were reviewed as well.



MEDICATIONS:  Reviewed as listed in the MRAD including IV steroids, DuoNebs.



PHYSICAL EXAMINATION:

VITAL SIGNS:  Blood pressure 184/86 which is high, afebrile, pulse ox 95% on 2

liters.

NECK:  Supple.

LUNGS:  Diminished breath sounds with few rhonchi.

CARDIOVASCULAR:  Regular rate and rhythm.

ABDOMEN:  Soft, nontender.

EXTREMITIES:  With no pitting edema.



LABORATORY DATA:  Reviewed.  Influenza was negative.  BUN 17, creatinine 0.9. 

White cell count 5.4, hemoglobin 12.1.



IMPRESSION:

1.  Acute bronchitis, but no definite consolidation seen on the chest x-ray.

2.  Abnormal chest x-ray with chronically elevated right hemidiaphragm, which

could be paralyzed.  No further workup is needed.

3.  Possible adult onset reactivate airway disease with mild exacerbation,

triggered by viral bronchitis.



RECOMMENDATIONS:

1.  Continue with present bronchodilators.

2.  Taper steroids.

3.  Continue empiric antibiotic.

4.  Cough suppressants.

5.  If the cough does not resolved after few weeks, then one should consider 
the effect

of lisinopril which currently she is on.  She could be discharged in the next

24 hours.

 



______________________________

MONICA MONTANO MD



DR:  Denise  JOB#:  [id number] / [id number]

DD:  03/08/2017 11:14  DT:  03/08/2017 11:40

SHARRON

## 2017-03-08 NOTE — HP
ADMIT DATE:  03/07/2017



CHIEF COMPLAINT:  Difficulty breathing.



HISTORY OF PRESENT ILLNESS:  A 71-year-old  female, patient of Dr. Hurd and Dr. Matrinez, with history of diabetes, who came in with increasing

cough and sputum production over the last 3-4 days.  She was seen in Dr. Martinez's

office and put on prednisone and Levaquin the day prior to admission and failed

to improve.  Chest x-ray did not show any acute changes.  Laboratory studies

were unremarkable.  She is feeling somewhat better at this time.



MEDICATIONS:  Include Tradjenta for diabetes and blood pressure medications.



ALLERGIES:  SHE IS ALLERGIC TO PENICILLIN AND TYLENOL.



SOCIAL HISTORY:  Nonsmoker, , not physically active, nondrinker.



FAMILY HISTORY:  Unremarkable.



REVIEW OF SYSTEMS:  No other complaints.



OBJECTIVE:

ENT:  All within normal limits.

NECK:  No masses, nodes or thyroid enlargement.

LUNGS:  Very faint expiratory wheezes.  No tachypnea or prolonged expiratory

phase.  No cough.

CARDIOVASCULAR:  Regular rate.  No irregular beat, murmur or tachycardia.

ABDOMEN:  Soft, obese, benign and nontender.

EXTREMITIES:  Good pedal and radial pulses.  No edema.  No joint or skin lesions

or clubbing.

NEUROLOGIC:  Physiologic, nonfocal, oriented x 4.



ASSESSMENT:  Asthma exacerbation, likely underlying secondary bronchitis, viral

or bacterial is unclear.  Type 2 diabetes, level of control unclear, last A1c

8.5 eight months ago.



PLAN:  Continue steroids at a lower dose.  Oral Levaquin and respiratory

treatments for now.

 



______________________________

JOSHUA MOON MD



DR:  BARBER/rsihi  JOB#:  [id number] / [id number]

DD:  03/08/2017 08:16  DT:  03/08/2017 09:08

## 2017-03-09 VITALS — DIASTOLIC BLOOD PRESSURE: 75 MMHG | SYSTOLIC BLOOD PRESSURE: 187 MMHG

## 2017-03-09 VITALS — DIASTOLIC BLOOD PRESSURE: 82 MMHG | SYSTOLIC BLOOD PRESSURE: 154 MMHG

## 2017-03-09 VITALS — DIASTOLIC BLOOD PRESSURE: 76 MMHG | SYSTOLIC BLOOD PRESSURE: 175 MMHG

## 2017-03-09 VITALS — DIASTOLIC BLOOD PRESSURE: 69 MMHG | SYSTOLIC BLOOD PRESSURE: 160 MMHG

## 2017-03-09 VITALS — DIASTOLIC BLOOD PRESSURE: 81 MMHG | SYSTOLIC BLOOD PRESSURE: 207 MMHG

## 2017-03-09 VITALS — SYSTOLIC BLOOD PRESSURE: 170 MMHG | DIASTOLIC BLOOD PRESSURE: 80 MMHG

## 2017-03-09 PROCEDURE — 5A09357 ASSISTANCE WITH RESPIRATORY VENTILATION, LESS THAN 24 CONSECUTIVE HOURS, CONTINUOUS POSITIVE AIRWAY PRESSURE: ICD-10-PCS | Performed by: FAMILY MEDICINE

## 2017-03-09 RX ADMIN — ASPIRIN SCH MG: 81 TABLET, COATED ORAL at 08:56

## 2017-03-09 RX ADMIN — METHYLPREDNISOLONE SODIUM SUCCINATE SCH MG: 40 INJECTION, POWDER, FOR SOLUTION INTRAMUSCULAR; INTRAVENOUS at 17:12

## 2017-03-09 RX ADMIN — LEVOFLOXACIN SCH MG: 500 TABLET, FILM COATED ORAL at 05:51

## 2017-03-09 RX ADMIN — IPRATROPIUM BROMIDE AND ALBUTEROL SULFATE SCH ML: .5; 3 SOLUTION RESPIRATORY (INHALATION) at 19:51

## 2017-03-09 RX ADMIN — PANTOPRAZOLE SODIUM SCH MG: 40 TABLET, DELAYED RELEASE ORAL at 08:10

## 2017-03-09 RX ADMIN — METHYLPREDNISOLONE SODIUM SUCCINATE SCH MG: 40 INJECTION, POWDER, FOR SOLUTION INTRAMUSCULAR; INTRAVENOUS at 10:15

## 2017-03-09 RX ADMIN — IPRATROPIUM BROMIDE AND ALBUTEROL SULFATE SCH ML: .5; 3 SOLUTION RESPIRATORY (INHALATION) at 07:55

## 2017-03-09 RX ADMIN — IPRATROPIUM BROMIDE AND ALBUTEROL SULFATE SCH ML: .5; 3 SOLUTION RESPIRATORY (INHALATION) at 16:06

## 2017-03-09 RX ADMIN — LISINOPRIL SCH MG: 20 TABLET ORAL at 08:58

## 2017-03-09 RX ADMIN — IPRATROPIUM BROMIDE AND ALBUTEROL SULFATE SCH ML: .5; 3 SOLUTION RESPIRATORY (INHALATION) at 12:01

## 2017-03-09 RX ADMIN — AMLODIPINE BESYLATE SCH MG: 5 TABLET ORAL at 08:57

## 2017-03-09 RX ADMIN — LINAGLIPTIN SCH MG: 5 TABLET, FILM COATED ORAL at 08:58

## 2017-03-09 RX ADMIN — INSULIN ASPART SCH UNITS: 100 INJECTION, SOLUTION INTRAVENOUS; SUBCUTANEOUS at 08:13

## 2017-03-09 RX ADMIN — VITAMIN D, TAB 1000IU (100/BT) SCH UNIT: 25 TAB at 08:58

## 2017-03-09 NOTE — PDOC
PULMONARY PROGRESS NOTES


Subjective


less cough, feels better


Vitals





 Vital Signs








  Date Time  Temp Pulse Resp B/P Pulse Ox O2 Delivery O2 Flow Rate FiO2


 


3/9/17 07:57     94 Room Air  


 


3/9/17 03:17 96.3 82 16 170/80    





 96.3       


 


3/8/17 20:00       2.0 








General:  Alert, Oriented X4, No acute distress


Lungs:  Other (decrease bs)


Cardiovascular:  S1, S2


Abdomen:  Soft, Non-tender


Extremities:  No Edema


Skin:  Warm


Labs





Laboratory Tests








Test


  3/7/17


12:45 3/7/17


12:50 3/7/17


16:23 3/7/17


20:53


 


Urine Collection Type Unknown    


 


Urine Color Yellow    


 


Urine Clarity Clear    


 


Urine pH 5.5    


 


Urine Specific Gravity 1.010    


 


Urine Protein


  100mg/dL


(NEG-TRACE) 


  


  


 


 


Urine Glucose (UA)


  Negativemg/dL


(NEG) 


  


  


 


 


Urine Ketones (Stick)


  Negativemg/dL


(NEG) 


  


  


 


 


Urine Blood Large (NEG)    


 


Urine Nitrite Negative (NEG)    


 


Urine Bilirubin Negative (NEG)    


 


Urine Urobilinogen Dipstick


  0.2mg/dL (0.2


mg/dL) 


  


  


 


 


Urine Leukocyte Esterase Negative (NEG)    


 


Urine RBC 6-10/HPF (0-2)    


 


Urine WBC 1-4/HPF (0-4)    


 


Urine Squamous Epithelial


Cells Few/LPF 


  


  


  


 


 


Urine Bacteria


  Few/HPF


(0-FEW) 


  


  


 


 


Influenza Type A Antigen


  Negative


(NEGATIVE) 


  


  


 


 


Influenza Type B Antigen


  Negative


(NEGATIVE) 


  


  


 


 


White Blood Count


  


  7.9x10^3/uL


(4.0-11.0) 


  


 


 


Red Blood Count


  


  4.87x10^6/uL


(3.50-5.40) 


  


 


 


Hemoglobin


  


  13.3g/dL


(12.0-15.5) 


  


 


 


Hematocrit


  


  40.9%


(36.0-47.0) 


  


 


 


Mean Corpuscular Volume  84fL ()   


 


Mean Corpuscular Hemoglobin  27pg (25-35)   


 


Mean Corpuscular Hemoglobin


Concent 


  33g/dL (31-37) 


  


  


 


 


Red Cell Distribution Width


  


  14.3%


(11.5-14.5) 


  


 


 


Platelet Count


  


  195x10^3/uL


(140-400) 


  


 


 


Neutrophils (%) (Auto)  80% (31-73)   


 


Lymphocytes (%) (Auto)  15% (24-48)   


 


Monocytes (%) (Auto)  3% (0-9)   


 


Eosinophils (%) (Auto)  1% (0-3)   


 


Basophils (%) (Auto)  0% (0-3)   


 


Neutrophils # (Auto)


  


  6.3x10^3uL


(1.8-7.7) 


  


 


 


Lymphocytes # (Auto)


  


  1.2x10^3/uL


(1.0-4.8) 


  


 


 


Monocytes # (Auto)


  


  0.3x10^3/uL


(0.0-1.1) 


  


 


 


Eosinophils # (Auto)


  


  0.1x10^3/uL


(0.0-0.7) 


  


 


 


Basophils # (Auto)


  


  0.0x10^3/uL


(0.0-0.2) 


  


 


 


Sodium Level


  


  141mmol/L


(136-145) 


  


 


 


Potassium Level


  


  4.4mmol/L


(3.5-5.1) 


  


 


 


Chloride Level


  


  104mmol/L


() 


  


 


 


Carbon Dioxide Level


  


  23mmol/L


(21-32) 


  


 


 


Anion Gap  14 (6-14)   


 


Blood Urea Nitrogen  17mg/dL (7-20)   


 


Creatinine


  


  0.8mg/dL


(0.6-1.0) 


  


 


 


Estimated GFR


(Cockcroft-Gault) 


  70.7 


  


  


 


 


BUN/Creatinine Ratio  21 (6-20)   


 


Glucose Level


  


  193mg/dL


(70-99) 


  


 


 


Calcium Level


  


  9.2mg/dL


(8.5-10.1) 


  


 


 


Total Bilirubin


  


  0.3mg/dL


(0.2-1.0) 


  


 


 


Aspartate Amino Transf


(AST/SGOT) 


  25U/L (15-37) 


  


  


 


 


Alanine Aminotransferase


(ALT/SGPT) 


  33U/L (14-59) 


  


  


 


 


Alkaline Phosphatase  98U/L ()   


 


Creatine Kinase  98U/L ()   


 


Creatine Kinase MB (Mass)


  


  1.3ng/mL


(0.0-3.6) 


  


 


 


Creatine Kinase MB Relative


Index 


  1.3% (0-4) 


  


  


 


 


Troponin I Quantitative


  


  < 0.017ng/mL


(0.000-0.055) 


  


 


 


NT-Pro-B-Type Natriuretic


Peptide 


  35pg/mL


(0-124) 


  


 


 


Total Protein


  


  8.2g/dL


(6.4-8.2) 


  


 


 


Albumin


  


  3.5g/dL


(3.4-5.0) 


  


 


 


Albumin/Globulin Ratio  0.7 (1.0-1.7)   


 


Glucose (Fingerstick)


  


  


  232mg/dL


(70-99) 298mg/dL


(70-99)














Test


  3/8/17


03:08 3/8/17


07:26 3/8/17


11:53 3/8/17


17:00


 


White Blood Count


  5.4x10^3/uL


(4.0-11.0) 


  


  


 


 


Red Blood Count


  4.47x10^6/uL


(3.50-5.40) 


  


  


 


 


Hemoglobin


  12.1g/dL


(12.0-15.5) 


  


  


 


 


Hematocrit


  36.7%


(36.0-47.0) 


  


  


 


 


Mean Corpuscular Volume 82fL ()    


 


Mean Corpuscular Hemoglobin 27pg (25-35)    


 


Mean Corpuscular Hemoglobin


Concent 33g/dL (31-37) 


  


  


  


 


 


Red Cell Distribution Width


  15.0%


(11.5-14.5) 


  


  


 


 


Platelet Count


  194x10^3/uL


(140-400) 


  


  


 


 


Neutrophils (%) (Auto) 76% (31-73)    


 


Lymphocytes (%) (Auto) 21% (24-48)    


 


Monocytes (%) (Auto) 3% (0-9)    


 


Eosinophils (%) (Auto) 0% (0-3)    


 


Basophils (%) (Auto) 0% (0-3)    


 


Neutrophils # (Auto)


  4.2x10^3uL


(1.8-7.7) 


  


  


 


 


Lymphocytes # (Auto)


  1.2x10^3/uL


(1.0-4.8) 


  


  


 


 


Monocytes # (Auto)


  0.1x10^3/uL


(0.0-1.1) 


  


  


 


 


Eosinophils # (Auto)


  0.0x10^3/uL


(0.0-0.7) 


  


  


 


 


Basophils # (Auto)


  0.0x10^3/uL


(0.0-0.2) 


  


  


 


 


Sodium Level


  137mmol/L


(136-145) 


  


  


 


 


Potassium Level


  4.2mmol/L


(3.5-5.1) 


  


  


 


 


Chloride Level


  104mmol/L


() 


  


  


 


 


Carbon Dioxide Level


  24mmol/L


(21-32) 


  


  


 


 


Anion Gap 9 (6-14)    


 


Blood Urea Nitrogen 17mg/dL (7-20)    


 


Creatinine


  0.9mg/dL


(0.6-1.0) 


  


  


 


 


Estimated GFR


(Cockcroft-Gault) 61.7 


  


  


  


 


 


Glucose Level


  291mg/dL


(70-99) 


  


  


 


 


Hemoglobin A1c 7.3% (4.8-5.6)    


 


Calcium Level


  8.9mg/dL


(8.5-10.1) 


  


  


 


 


Glucose (Fingerstick)


  


  265mg/dL


(70-99) 381mg/dL


(70-99) 280mg/dL


(70-99)














Test


  3/8/17


20:45 3/9/17


07:26 


  


 


 


Glucose (Fingerstick)


  303mg/dL


(70-99) 271mg/dL


(70-99) 


  


 








Laboratory Tests








Test


  3/8/17


11:53 3/8/17


17:00 3/8/17


20:45 3/9/17


07:26


 


Glucose (Fingerstick)


  381mg/dL


(70-99) 280mg/dL


(70-99) 303mg/dL


(70-99) 271mg/dL


(70-99)








Medications





Active Scripts








 Medications  Dose


 Route/Sig Days Date Category


 


 Vitamin D3


  (Cholecalciferol


  (Vitamin D3))


 1,000 Unit Tablet  1 Tab


 PO DAILY   3/7/17 Reported


 


 Aspir 81


  (Aspirin) 81 Mg


 Tablet.dr  1 Tab


 PO DAILY   3/7/17 Reported


 


 Levaquin


  (Levofloxacin)


 750 Mg Tablet  500 Mg


 PO DAILY06   7/15/16 Rx


 


 Tradjenta


  (Linagliptin) 5


 Mg Tablet  5 Mg


 PO DAILY   7/15/16 Rx


 


 Omeprazole 20 Mg


 Capsule.dr  1 Cap


 PO DAILYAC   7/12/16 Reported


 


 Amlodipine


 Besylate 5 Mg


 Tablet  5 Mg


 PO DAILY   7/12/16 Reported


 


 Lisinopril 20 Mg


 Tablet  1 Tab


 PO DAILY   7/12/16 Reported


 


 Ibuprofen 800 Mg


 Tablet  800 Mg


 PO PRN Q8HRS PRN   7/12/16 Reported


 


 Ventolin Hfa


 Inhaler


  (Albuterol


 Sulfate) 18 Gm


 Hfa.aer.ad  2 Puff


 INH Q4HRS   7/12/16 Reported











Impression


.


1.  Acute bronchitis, but no definite consolidation seen on the chest x-ray.


2.  Abnormal chest x-ray with chronically elevated right hemidiaphragm, which


could be paralyzed.  No further workup is needed.


3.  Possible adult onset reactivate airway disease with mild exacerbation,


triggered by viral bronchitis.





Plan


.





1.  Continue with present bronchodilators.


2.  Taper steroids.


3.  Continue empiric antibiotic.


4.  Cough suppressants.


5.  If the cough does not resolve after few weeks, then one should consider the 

effect


of lisinopril which currently she is on.  She could be discharged today








MONICA MONTANO MD Mar 9, 2017 08:58

## 2017-03-09 NOTE — PDOC
PROGRESS NOTES


Subjective


Subjective


Pt awake and alert. States breathing is less labored. States she does not want 

to take any more insulin. Pt states she has been eating and drinking well. 

Denies n/v/d.





Objective


Objective


Pt awake and alert. NAD. VSS. Afebrile. FSBS elevated. Lungs with exp wheeze 

throughout. Pt on RA, not requiring supplemental O2. Heart with RRR. No 

murmurs. 


 Vital Signs








  Date Time  Temp Pulse Resp B/P Pulse Ox O2 Delivery O2 Flow Rate FiO2


 


3/9/17 08:58  82  170/80    


 


3/9/17 07:57     94 Room Air  


 


3/9/17 07:00 97.5  20    2.0 





 97.5       














 Intake and Output 


 


 3/9/17





 07:00


 


Intake Total 1500 ml


 


Balance 1500 ml


 


 


 


Intake Oral 1500 ml


 


# Voids 7











Assessment


Assessment


 Problems


Medical Problems:


(1) Acute respiratory distress


Status: Acute  





(2) Diabetes mellitus


Status: Acute  





(3) Hypoxia


Status: Acute  











Plan


Plan of Care


1. Acute bronchitis with exaccerbation of asthma


   -Pulmonology consulting


   -Pt on Duonebs, SoluMedrol and empiric Levaquin





2. DM, noninsulin dependent


   -Pt on Trajenta


   -Hgb A1c 7.3


   -FSBS elevated with admission in upper 200s, probable secondary to steroids


      -Pt refusing Novolog





Nursing staff encouraged to begin to mobilize pt. Hopeful for Dc tomorrow am.





Comment


Review of Relevant


I have reviewed the following items aminah (where applicable) has been applied.


Labs





Laboratory Tests








Test


  3/7/17


12:45 3/7/17


12:50 3/7/17


16:23 3/7/17


20:53


 


Urine Collection Type Unknown    


 


Urine Color Yellow    


 


Urine Clarity Clear    


 


Urine pH 5.5    


 


Urine Specific Gravity 1.010    


 


Urine Protein


  100mg/dL


(NEG-TRACE) 


  


  


 


 


Urine Glucose (UA)


  Negativemg/dL


(NEG) 


  


  


 


 


Urine Ketones (Stick)


  Negativemg/dL


(NEG) 


  


  


 


 


Urine Blood Large (NEG)    


 


Urine Nitrite Negative (NEG)    


 


Urine Bilirubin Negative (NEG)    


 


Urine Urobilinogen Dipstick


  0.2mg/dL (0.2


mg/dL) 


  


  


 


 


Urine Leukocyte Esterase Negative (NEG)    


 


Urine RBC 6-10/HPF (0-2)    


 


Urine WBC 1-4/HPF (0-4)    


 


Urine Squamous Epithelial


Cells Few/LPF 


  


  


  


 


 


Urine Bacteria


  Few/HPF


(0-FEW) 


  


  


 


 


Influenza Type A Antigen


  Negative


(NEGATIVE) 


  


  


 


 


Influenza Type B Antigen


  Negative


(NEGATIVE) 


  


  


 


 


White Blood Count


  


  7.9x10^3/uL


(4.0-11.0) 


  


 


 


Red Blood Count


  


  4.87x10^6/uL


(3.50-5.40) 


  


 


 


Hemoglobin


  


  13.3g/dL


(12.0-15.5) 


  


 


 


Hematocrit


  


  40.9%


(36.0-47.0) 


  


 


 


Mean Corpuscular Volume  84fL ()   


 


Mean Corpuscular Hemoglobin  27pg (25-35)   


 


Mean Corpuscular Hemoglobin


Concent 


  33g/dL (31-37) 


  


  


 


 


Red Cell Distribution Width


  


  14.3%


(11.5-14.5) 


  


 


 


Platelet Count


  


  195x10^3/uL


(140-400) 


  


 


 


Neutrophils (%) (Auto)  80% (31-73)   


 


Lymphocytes (%) (Auto)  15% (24-48)   


 


Monocytes (%) (Auto)  3% (0-9)   


 


Eosinophils (%) (Auto)  1% (0-3)   


 


Basophils (%) (Auto)  0% (0-3)   


 


Neutrophils # (Auto)


  


  6.3x10^3uL


(1.8-7.7) 


  


 


 


Lymphocytes # (Auto)


  


  1.2x10^3/uL


(1.0-4.8) 


  


 


 


Monocytes # (Auto)


  


  0.3x10^3/uL


(0.0-1.1) 


  


 


 


Eosinophils # (Auto)


  


  0.1x10^3/uL


(0.0-0.7) 


  


 


 


Basophils # (Auto)


  


  0.0x10^3/uL


(0.0-0.2) 


  


 


 


Sodium Level


  


  141mmol/L


(136-145) 


  


 


 


Potassium Level


  


  4.4mmol/L


(3.5-5.1) 


  


 


 


Chloride Level


  


  104mmol/L


() 


  


 


 


Carbon Dioxide Level


  


  23mmol/L


(21-32) 


  


 


 


Anion Gap  14 (6-14)   


 


Blood Urea Nitrogen  17mg/dL (7-20)   


 


Creatinine


  


  0.8mg/dL


(0.6-1.0) 


  


 


 


Estimated GFR


(Cockcroft-Gault) 


  70.7 


  


  


 


 


BUN/Creatinine Ratio  21 (6-20)   


 


Glucose Level


  


  193mg/dL


(70-99) 


  


 


 


Calcium Level


  


  9.2mg/dL


(8.5-10.1) 


  


 


 


Total Bilirubin


  


  0.3mg/dL


(0.2-1.0) 


  


 


 


Aspartate Amino Transf


(AST/SGOT) 


  25U/L (15-37) 


  


  


 


 


Alanine Aminotransferase


(ALT/SGPT) 


  33U/L (14-59) 


  


  


 


 


Alkaline Phosphatase  98U/L ()   


 


Creatine Kinase  98U/L ()   


 


Creatine Kinase MB (Mass)


  


  1.3ng/mL


(0.0-3.6) 


  


 


 


Creatine Kinase MB Relative


Index 


  1.3% (0-4) 


  


  


 


 


Troponin I Quantitative


  


  < 0.017ng/mL


(0.000-0.055) 


  


 


 


NT-Pro-B-Type Natriuretic


Peptide 


  35pg/mL


(0-124) 


  


 


 


Total Protein


  


  8.2g/dL


(6.4-8.2) 


  


 


 


Albumin


  


  3.5g/dL


(3.4-5.0) 


  


 


 


Albumin/Globulin Ratio  0.7 (1.0-1.7)   


 


Glucose (Fingerstick)


  


  


  232mg/dL


(70-99) 298mg/dL


(70-99)














Test


  3/8/17


03:08 3/8/17


07:26 3/8/17


11:53 3/8/17


17:00


 


White Blood Count


  5.4x10^3/uL


(4.0-11.0) 


  


  


 


 


Red Blood Count


  4.47x10^6/uL


(3.50-5.40) 


  


  


 


 


Hemoglobin


  12.1g/dL


(12.0-15.5) 


  


  


 


 


Hematocrit


  36.7%


(36.0-47.0) 


  


  


 


 


Mean Corpuscular Volume 82fL ()    


 


Mean Corpuscular Hemoglobin 27pg (25-35)    


 


Mean Corpuscular Hemoglobin


Concent 33g/dL (31-37) 


  


  


  


 


 


Red Cell Distribution Width


  15.0%


(11.5-14.5) 


  


  


 


 


Platelet Count


  194x10^3/uL


(140-400) 


  


  


 


 


Neutrophils (%) (Auto) 76% (31-73)    


 


Lymphocytes (%) (Auto) 21% (24-48)    


 


Monocytes (%) (Auto) 3% (0-9)    


 


Eosinophils (%) (Auto) 0% (0-3)    


 


Basophils (%) (Auto) 0% (0-3)    


 


Neutrophils # (Auto)


  4.2x10^3uL


(1.8-7.7) 


  


  


 


 


Lymphocytes # (Auto)


  1.2x10^3/uL


(1.0-4.8) 


  


  


 


 


Monocytes # (Auto)


  0.1x10^3/uL


(0.0-1.1) 


  


  


 


 


Eosinophils # (Auto)


  0.0x10^3/uL


(0.0-0.7) 


  


  


 


 


Basophils # (Auto)


  0.0x10^3/uL


(0.0-0.2) 


  


  


 


 


Sodium Level


  137mmol/L


(136-145) 


  


  


 


 


Potassium Level


  4.2mmol/L


(3.5-5.1) 


  


  


 


 


Chloride Level


  104mmol/L


() 


  


  


 


 


Carbon Dioxide Level


  24mmol/L


(21-32) 


  


  


 


 


Anion Gap 9 (6-14)    


 


Blood Urea Nitrogen 17mg/dL (7-20)    


 


Creatinine


  0.9mg/dL


(0.6-1.0) 


  


  


 


 


Estimated GFR


(Cockcroft-Gault) 61.7 


  


  


  


 


 


Glucose Level


  291mg/dL


(70-99) 


  


  


 


 


Hemoglobin A1c 7.3% (4.8-5.6)    


 


Calcium Level


  8.9mg/dL


(8.5-10.1) 


  


  


 


 


Glucose (Fingerstick)


  


  265mg/dL


(70-99) 381mg/dL


(70-99) 280mg/dL


(70-99)














Test


  3/8/17


20:45 3/9/17


07:26 


  


 


 


Glucose (Fingerstick)


  303mg/dL


(70-99) 271mg/dL


(70-99) 


  


 








Laboratory Tests








Test


  3/8/17


11:53 3/8/17


17:00 3/8/17


20:45 3/9/17


07:26


 


Glucose (Fingerstick)


  381mg/dL


(70-99) 280mg/dL


(70-99) 303mg/dL


(70-99) 271mg/dL


(70-99)








Medications





 Current Medications


Albuterol/ Ipratropium (Duoneb) 6 ml 1X  ONCE NEB  Last administered on 3/7/

17at 12:55;  Start 3/7/17 at 13:00;  Stop 3/7/17 at 13:01;  Status DC


Ondansetron HCl 4 mg 4 mg PRN Q8HRS  PRN IV NAUSEA/VOMITING;  Start 3/7/17 at 15

:45;  Stop 3/8/17 at 15:44;  Status DC


Sodium Chloride (Iv Sodium Chloride 0.9% 1000ml Bag) 1,000 ml @  100 mls/hr 

Q10H IV  Last administered on 3/8/17at 02:00;  Start 3/7/17 at 16:00;  Stop 3/8/

17 at 08:14;  Status DC


Albuterol/ Ipratropium (Duoneb) 3 ml RTQID NEB  Last administered on 3/8/17at 11

:40;  Start 3/7/17 at 16:00;  Stop 3/8/17 at 15:59;  Status DC


Methylprednisolone Sodium Succinate (Solu-Medrol 40mg Vial) 60 mg Q6HRS IV  

Last administered on 3/8/17at 05:21;  Start 3/7/17 at 18:00;  Stop 3/8/17 at 08:

06;  Status DC


Fentanyl Citrate (Fentanyl 2ml Vial) 50 mcg PRN Q2HR  PRN IV PAIN Last 

administered on 3/7/17at 15:53;  Start 3/7/17 at 15:45;  Stop 3/8/17 at 15:44;  

Status DC


Insulin Aspart (Novolog) 0-9 UNITS TIDWMEALS SQ  Last administered on 3/9/17at 

08:13;  Start 3/7/17 at 18:00


Dextrose 12.5 gm PRN Q15MIN  PRN IV SEE COMMENTS;  Start 3/7/17 at 17:30


Amlodipine Besylate (Norvasc) 5 mg DAILY PO  Last administered on 3/9/17at 08:57

;  Start 3/7/17 at 21:00


Aspirin (Ecotrin) 81 mg DAILY PO  Last administered on 3/9/17at 08:56;  Start 3/

8/17 at 09:00


Vitamin D (Vitamin D3) 1,000 unit DAILY PO  Last administered on 3/9/17at 08:58

;  Start 3/8/17 at 09:00


Linagliptin (Tradjenta) 5 mg DAILY PO  Last administered on 3/9/17at 08:58;  

Start 3/8/17 at 09:00


Lisinopril (Prinivil) 20 mg DAILY PO  Last administered on 3/9/17at 08:58;  

Start 3/7/17 at 21:00


Pantoprazole Sodium (Protonix) 40 mg DAILYAC PO  Last administered on 3/9/17at 

08:10;  Start 3/8/17 at 07:30


Insulin Aspart (Novolog) 8 units 1X  ONCE SQ  Last administered on 3/7/17at 21:

45;  Start 3/7/17 at 22:00;  Stop 3/7/17 at 22:01;  Status DC


Methylprednisolone Sodium Succinate (Solu-Medrol 40mg Vial) 60 mg BIDAFTMEAL IV

  Last administered on 3/9/17at 10:15;  Start 3/8/17 at 18:00


Albuterol/ Ipratropium (Duoneb) 3 ml RTQID NEB  Last administered on 3/9/17at 07

:55;  Start 3/8/17 at 16:00


Levofloxacin (Levaquin) 500 mg DAILY06 PO  Last administered on 3/9/17at 05:51;

  Start 3/8/17 at 09:00


Insulin Detemir (Levemir) 20 units 1X  ONCE SQ  Last administered on 3/8/17at 09

:11;  Start 3/8/17 at 08:30;  Stop 3/8/17 at 08:31;  Status DC


Insulin Aspart (Novolog) 15 units 1X  STAT SQ  Last administered on 3/8/17at 13:

58;  Start 3/8/17 at 13:58;  Stop 3/8/17 at 14:03;  Status DC


Clonidine HCl (Catapres) 0.1 mg 1X  ONCE PO  Last administered on 3/8/17at 17:22

;  Start 3/8/17 at 16:45;  Stop 3/8/17 at 16:46;  Status DC


Clonidine HCl (Catapres) 0.1 mg PRN Q6HRS  PRN PO HYPERTENSION, SEE COMMENTS;  

Start 3/8/17 at 16:30





Active Scripts


Active


Levaquin (Levofloxacin) 750 Mg Tablet 500 Mg PO DAILY06


Tradjenta (Linagliptin) 5 Mg Tablet 5 Mg PO DAILY


Reported


Vitamin D3 (Cholecalciferol (Vitamin D3)) 1,000 Unit Tablet 1 Tab PO DAILY


Aspir 81 (Aspirin) 81 Mg Tablet.dr 1 Tab PO DAILY


Omeprazole 20 Mg Capsule.dr 1 Cap PO DAILYAC


Amlodipine Besylate 5 Mg Tablet 5 Mg PO DAILY


Lisinopril 20 Mg Tablet 1 Tab PO DAILY


Ibuprofen 800 Mg Tablet 800 Mg PO PRN Q8HRS PRN


Ventolin Hfa Inhaler (Albuterol Sulfate) 18 Gm Hfa.aer.ad 2 Puff INH Q4HRS


Vitals/I & O





 Vital Sign - Last 24 Hours








 3/8/17 3/8/17 3/8/17 3/8/17





 11:00 11:41 15:00 16:10


 


Temp 96.3  98.8 





 96.3  98.8 


 


Pulse 93  89 


 


Resp 21  22 


 


B/P 184/86  189/82 


 


Pulse Ox 95  94 


 


O2 Delivery Nasal Cannula Room Air Nasal Cannula Room Air


 


O2 Flow Rate 2.0  2.0 


 


    





    





 3/8/17 3/8/17 3/8/17 3/8/17





 17:22 17:30 19:28 19:42


 


Temp    97.7





    97.7


 


Pulse 89   84


 


Resp    16


 


B/P 189/82 175/83  176/68


 


Pulse Ox    93


 


O2 Delivery   Room Air Room Air


 


    





    





 3/8/17 3/8/17 3/9/17 3/9/17





 20:00 23:04 03:17 07:00


 


Temp  97.9 96.3 97.5





  97.9 96.3 97.5


 


Pulse  87 82 84


 


Resp  16 16 20


 


B/P  159/70 170/80 154/82


 


Pulse Ox  95 94 92


 


O2 Delivery Room Air Room Air BiPAP/CPAP Nasal Cannula


 


O2 Flow Rate 2.0   2.0


 


    





    





 3/9/17 3/9/17 3/9/17 





 07:57 08:57 08:58 


 


Pulse  82 82 


 


B/P  170/80 170/80 


 


Pulse Ox 94   


 


O2 Delivery Room Air   














 Intake and Output   


 


 3/8/17 3/8/17 3/9/17





 15:00 23:00 07:00


 


Intake Total 500 ml 1000 ml 


 


Balance 500 ml 1000 ml 














JENNIFER HATCH MD Mar 9, 2017 10:44

## 2017-03-10 VITALS
SYSTOLIC BLOOD PRESSURE: 192 MMHG | DIASTOLIC BLOOD PRESSURE: 78 MMHG | SYSTOLIC BLOOD PRESSURE: 192 MMHG | SYSTOLIC BLOOD PRESSURE: 192 MMHG | SYSTOLIC BLOOD PRESSURE: 192 MMHG | DIASTOLIC BLOOD PRESSURE: 78 MMHG | DIASTOLIC BLOOD PRESSURE: 78 MMHG | DIASTOLIC BLOOD PRESSURE: 78 MMHG

## 2017-03-10 VITALS — SYSTOLIC BLOOD PRESSURE: 187 MMHG | DIASTOLIC BLOOD PRESSURE: 90 MMHG

## 2017-03-10 VITALS — DIASTOLIC BLOOD PRESSURE: 82 MMHG | SYSTOLIC BLOOD PRESSURE: 170 MMHG

## 2017-03-10 RX ADMIN — PANTOPRAZOLE SODIUM SCH MG: 40 TABLET, DELAYED RELEASE ORAL at 08:42

## 2017-03-10 RX ADMIN — AMLODIPINE BESYLATE SCH MG: 5 TABLET ORAL at 08:43

## 2017-03-10 RX ADMIN — IPRATROPIUM BROMIDE AND ALBUTEROL SULFATE SCH ML: .5; 3 SOLUTION RESPIRATORY (INHALATION) at 12:00

## 2017-03-10 RX ADMIN — ASPIRIN SCH MG: 81 TABLET, COATED ORAL at 08:43

## 2017-03-10 RX ADMIN — LINAGLIPTIN SCH MG: 5 TABLET, FILM COATED ORAL at 08:44

## 2017-03-10 RX ADMIN — LEVOFLOXACIN SCH MG: 500 TABLET, FILM COATED ORAL at 05:38

## 2017-03-10 RX ADMIN — IPRATROPIUM BROMIDE AND ALBUTEROL SULFATE SCH ML: .5; 3 SOLUTION RESPIRATORY (INHALATION) at 07:28

## 2017-03-10 RX ADMIN — METHYLPREDNISOLONE SODIUM SUCCINATE SCH MG: 40 INJECTION, POWDER, FOR SOLUTION INTRAMUSCULAR; INTRAVENOUS at 08:42

## 2017-03-10 RX ADMIN — LISINOPRIL SCH MG: 20 TABLET ORAL at 08:44

## 2017-03-10 RX ADMIN — VITAMIN D, TAB 1000IU (100/BT) SCH UNIT: 25 TAB at 08:45

## 2017-03-10 NOTE — PDOC
PULMONARY PROGRESS NOTES


Subjective


less cough, feels better


Vitals





 Vital Signs








  Date Time  Temp Pulse Resp B/P Pulse Ox O2 Delivery O2 Flow Rate FiO2


 


3/10/17 08:44  86  187/90    


 


3/10/17 07:29     94 Room Air  


 


3/10/17 07:10 97.9  18     





 97.9       


 


3/9/17 20:00       2.0 








General:  Alert, Oriented X4, No acute distress


Lungs:  Other (decrease bs)


Cardiovascular:  S1, S2


Abdomen:  Soft, Non-tender


Extremities:  No Edema


Skin:  Warm


Labs





Laboratory Tests








Test


  3/8/17


11:53 3/8/17


17:00 3/8/17


20:45 3/9/17


07:26


 


Glucose (Fingerstick)


  381mg/dL


(70-99) 280mg/dL


(70-99) 303mg/dL


(70-99) 271mg/dL


(70-99)








Medications





Active Scripts








 Medications  Dose


 Route/Sig Days Date Category


 


 Vitamin D3


  (Cholecalciferol


  (Vitamin D3))


 1,000 Unit Tablet  1 Tab


 PO DAILY   3/7/17 Reported


 


 Aspir 81


  (Aspirin) 81 Mg


 Tablet.dr  1 Tab


 PO DAILY   3/7/17 Reported


 


 Levaquin


  (Levofloxacin)


 750 Mg Tablet  500 Mg


 PO DAILY06   7/15/16 Rx


 


 Tradjenta


  (Linagliptin) 5


 Mg Tablet  5 Mg


 PO DAILY   7/15/16 Rx


 


 Omeprazole 20 Mg


 Capsule.dr  1 Cap


 PO DAILYAC   7/12/16 Reported


 


 Amlodipine


 Besylate 5 Mg


 Tablet  5 Mg


 PO DAILY   7/12/16 Reported


 


 Lisinopril 20 Mg


 Tablet  1 Tab


 PO DAILY   7/12/16 Reported


 


 Ibuprofen 800 Mg


 Tablet  800 Mg


 PO PRN Q8HRS PRN   7/12/16 Reported


 


 Ventolin Hfa


 Inhaler


  (Albuterol


 Sulfate) 18 Gm


 Hfa.aer.ad  2 Puff


 INH Q4HRS   7/12/16 Reported











Impression


.


1.  Acute bronchitis, but no definite consolidation seen on the chest x-ray.


2.  Abnormal chest x-ray with chronically elevated right hemidiaphragm, which


could be paralyzed.  No further workup is needed.


3.  Possible adult onset reactivate airway disease with mild exacerbation,


triggered by viral bronchitis.





Plan


.





1.  Continue with present bronchodilators.


2.  Taper steroids.


3.  Continue empiric antibiotic.


4.  Cough suppressants.


5.  ok with dc today








MONICA MONTANO MD Mar 10, 2017 09:50

## 2017-03-10 NOTE — DS
DATE OF DISCHARGE:  03/10/2017



DISCHARGE DIAGNOSES:

1.  Acute bronchitis with exacerbation of asthma.

2.  Diabetes mellitus with hyperglycemic changes with current illness.



HISTORY OF PRESENT ILLNESS:  This is a 71-year-old female who is well known to

me from followup in the clinic.  The patient presented to the Emergency Room on

the day of admission with complaints of increased shortness of breath and a

productive cough.  The patient was seen in our clinic the day prior to

presenting to the Emergency Room and was started on Levaquin and prednisone. 

The patient had no improvement and in fact her signs and symptoms gradually

worsened.  Upon evaluation in the Emergency Room, the patient was found to be

hypoxic with O2 saturation of 88% and DuoNeb treatment was administered.  The

patient remained hypoxic and required supplemental oxygen.  EKG was done which

showed no acute ST or T-wave changes.  A chest x-ray showed no acute

abnormality.  Laboratory findings were relatively within normal limits.  Due to

the patient's continued hypoxia, the patient was admitted to the hospital for

further evaluation and treatment.



SUMMARY OF STAY:  Upon admission, Pulmonology was consulted.  The patient was

given DueNebs unscheduled, was started on Solu-Medrol IV and was given empiric

Levaquin.  The patient's fingerstick blood sugars did gradually increase, this

was thought to be secondary to the dosing of steroids.  NovoLog was ordered;

however, the patient denied dosing of this.  The patient was continued on her

p.o. Tradjenta.  Hemoglobin A1c revealed 7.3, which shows that the patient's

diabetes was pretty well controlled on an outpatient basis prior to the acute

illness and dosing of steroids.  Over the length of the patient's stay, her

signs and symptoms did gradually improve.  On the day of discharge, the patient

did have some loose rhonchi throughout.  Her lung had mild expiratory wheeze. 

She was on room air, requiring no supplemental oxygen and was able to talk in

complete sentences without signs and symptoms of shortness of breath.



DIET:  ADA.



ACTIVITY:  As tolerated.



DISCHARGE MEDICATIONS:  The patient will resume her previous home medications

with the addition of prednisone taper x 9 days and doxycycline b.i.d. x 10 days.



FOLLOWUP:  The patient is to follow up in our clinic in 1 week, sooner if her

signs and symptoms indicate.  The patient stated understanding of the above

discharge summary, denied questions, and will follow up accordingly.

 



______________________________

JENNIFER HATCH MD



DR:  ADV/nts  JOB#:  [id number] / [id number]

DD:  03/10/2017 10:31  DT:  03/10/2017 20:21

## 2017-03-10 NOTE — PDOC
PROGRESS NOTES


Subjective


Subjective


Pt awake and pleasant this am. States she is feeling better. Denies pain. 

States she has been eating and drinking well with good output.





Objective


Objective


Pt awake and alert. NAD. VSS, BP elevated this am. Afebrile. Lungs with loose 

rhonchi and expiratory wheeze. Resp even and unlabored. Pt on RA, not requiring 

supplemental O2. 


 Vital Signs








  Date Time  Temp Pulse Resp B/P Pulse Ox O2 Delivery O2 Flow Rate FiO2


 


3/10/17 08:44  86  187/90    


 


3/10/17 07:29     94 Room Air  


 


3/10/17 07:10 97.9  18     





 97.9       


 


3/9/17 20:00       2.0 














 Intake and Output 


 


 3/10/17





 07:00


 


Intake Total 2400 ml


 


Balance 2400 ml


 


 


 


Intake Oral 2400 ml


 


# Voids 7











Assessment


Assessment


 Problems


Medical Problems:


(1) Acute respiratory distress


Status: Acute  





(2) Diabetes mellitus


Status: Acute  





(3) Hypoxia


Status: Acute  











Plan


Plan of Care


1. Acute bronchitis with exacerbation of asthma


   -Pulmonology consulting


   -Pt on Duonebs, SoluMedrol and empiric Levaquin





2. DM, noninsulin dependent


   -Pt on Trajenta


   -Hgb A1c 7.3


   -FSBS elevated with admission in upper 200s, probable secondary to steroids


      -Pt refusing Novolog





Dc pt home today. Resume previous home medications with addition of Prednisone 

taper x9 days and Doxy bid x 10d. ADA diet. Activity as tolerated. Pt to f/u in 

our office in 1 week ([phone]).





Comment


Review of Relevant


I have reviewed the following items aminah (where applicable) has been applied.


Labs





Laboratory Tests








Test


  3/8/17


11:53 3/8/17


17:00 3/8/17


20:45 3/9/17


07:26


 


Glucose (Fingerstick)


  381mg/dL


(70-99) 280mg/dL


(70-99) 303mg/dL


(70-99) 271mg/dL


(70-99)








Medications





 Current Medications


Albuterol/ Ipratropium (Duoneb) 6 ml 1X  ONCE NEB  Last administered on 3/7/

17at 12:55;  Start 3/7/17 at 13:00;  Stop 3/7/17 at 13:01;  Status DC


Ondansetron HCl 4 mg 4 mg PRN Q8HRS  PRN IV NAUSEA/VOMITING;  Start 3/7/17 at 15

:45;  Stop 3/8/17 at 15:44;  Status DC


Sodium Chloride (Iv Sodium Chloride 0.9% 1000ml Bag) 1,000 ml @  100 mls/hr 

Q10H IV  Last administered on 3/8/17at 02:00;  Start 3/7/17 at 16:00;  Stop 3/8/

17 at 08:14;  Status DC


Albuterol/ Ipratropium (Duoneb) 3 ml RTQID NEB  Last administered on 3/8/17at 11

:40;  Start 3/7/17 at 16:00;  Stop 3/8/17 at 15:59;  Status DC


Methylprednisolone Sodium Succinate (Solu-Medrol 40mg Vial) 60 mg Q6HRS IV  

Last administered on 3/8/17at 05:21;  Start 3/7/17 at 18:00;  Stop 3/8/17 at 08:

06;  Status DC


Fentanyl Citrate (Fentanyl 2ml Vial) 50 mcg PRN Q2HR  PRN IV PAIN Last 

administered on 3/7/17at 15:53;  Start 3/7/17 at 15:45;  Stop 3/8/17 at 15:44;  

Status DC


Insulin Aspart (Novolog) 0-9 UNITS TIDWMEALS SQ  Last administered on 3/9/17at 

08:13;  Start 3/7/17 at 18:00;  Stop 3/9/17 at 11:28;  Status DC


Dextrose 12.5 gm PRN Q15MIN  PRN IV SEE COMMENTS;  Start 3/7/17 at 17:30


Amlodipine Besylate (Norvasc) 5 mg DAILY PO  Last administered on 3/10/17at 08:

43;  Start 3/7/17 at 21:00


Aspirin (Ecotrin) 81 mg DAILY PO  Last administered on 3/10/17at 08:43;  Start 3

/8/17 at 09:00


Vitamin D (Vitamin D3) 1,000 unit DAILY PO  Last administered on 3/10/17at 08:45

;  Start 3/8/17 at 09:00


Linagliptin (Tradjenta) 5 mg DAILY PO  Last administered on 3/10/17at 08:44;  

Start 3/8/17 at 09:00


Lisinopril (Prinivil) 20 mg DAILY PO  Last administered on 3/10/17at 08:44;  

Start 3/7/17 at 21:00


Pantoprazole Sodium (Protonix) 40 mg DAILYAC PO  Last administered on 3/10/17at 

08:42;  Start 3/8/17 at 07:30


Insulin Aspart (Novolog) 8 units 1X  ONCE SQ  Last administered on 3/7/17at 21:

45;  Start 3/7/17 at 22:00;  Stop 3/7/17 at 22:01;  Status DC


Methylprednisolone Sodium Succinate (Solu-Medrol 40mg Vial) 60 mg BIDAFTMEAL IV

  Last administered on 3/10/17at 08:42;  Start 3/8/17 at 18:00


Albuterol/ Ipratropium (Duoneb) 3 ml RTQID NEB  Last administered on 3/10/17at 

07:28;  Start 3/8/17 at 16:00


Levofloxacin (Levaquin) 500 mg DAILY06 PO  Last administered on 3/10/17at 05:38

;  Start 3/8/17 at 09:00


Insulin Detemir (Levemir) 20 units 1X  ONCE SQ  Last administered on 3/8/17at 09

:11;  Start 3/8/17 at 08:30;  Stop 3/8/17 at 08:31;  Status DC


Insulin Aspart (Novolog) 15 units 1X  STAT SQ  Last administered on 3/8/17at 13:

58;  Start 3/8/17 at 13:58;  Stop 3/8/17 at 14:03;  Status DC


Clonidine HCl (Catapres) 0.1 mg 1X  ONCE PO  Last administered on 3/8/17at 17:22

;  Start 3/8/17 at 16:45;  Stop 3/8/17 at 16:46;  Status DC


Clonidine HCl (Catapres) 0.1 mg PRN Q6HRS  PRN PO HYPERTENSION, SEE COMMENTS;  

Start 3/8/17 at 16:30





Active Scripts


Active


Levaquin (Levofloxacin) 750 Mg Tablet 500 Mg PO DAILY06


Tradjenta (Linagliptin) 5 Mg Tablet 5 Mg PO DAILY


Reported


Vitamin D3 (Cholecalciferol (Vitamin D3)) 1,000 Unit Tablet 1 Tab PO DAILY


Aspir 81 (Aspirin) 81 Mg Tablet. 1 Tab PO DAILY


Omeprazole 20 Mg Capsule. 1 Cap PO DAILYAC


Amlodipine Besylate 5 Mg Tablet 5 Mg PO DAILY


Lisinopril 20 Mg Tablet 1 Tab PO DAILY


Ibuprofen 800 Mg Tablet 800 Mg PO PRN Q8HRS PRN


Ventolin Hfa Inhaler (Albuterol Sulfate) 18 Gm Hfa.aer.ad 2 Puff INH Q4HRS


Vitals/I & O





 Vital Sign - Last 24 Hours








 3/9/17 3/9/17 3/9/17 3/9/17





 11:00 12:02 15:00 16:07


 


Temp 96.6  96.6 





 96.6  96.6 


 


Pulse 101  79 


 


Resp 22  20 


 


B/P 175/76  160/69 


 


Pulse Ox 94  92 


 


O2 Delivery Room Air Room Air Room Air Room Air


 


    





    





 3/9/17 3/9/17 3/9/17 3/9/17





 19:45 19:52 20:00 23:53


 


Temp 98.1   97.9





 98.1   97.9


 


Pulse 102   90


 


Resp 20   18


 


B/P 207/81   187/75


 


Pulse Ox 94 96  95


 


O2 Delivery Room Air Room Air Room Air Room Air


 


O2 Flow Rate   2.0 


 


    





    





 3/10/17 3/10/17 3/10/17 3/10/17





 03:36 07:10 07:29 08:43


 


Temp 97.7 97.9  





 97.7 97.9  


 


Pulse 85 86  86


 


Resp 16 18  


 


B/P 170/82 187/90  187/90


 


Pulse Ox 94 93 94 


 


O2 Delivery Room Air Room Air Room Air 


 


    





    





 3/10/17   





 08:44   


 


Pulse 86   


 


B/P 187/90   














 Intake and Output   


 


 3/9/17 3/9/17 3/10/17





 15:00 23:00 07:00


 


Intake Total 650 ml 1750 ml 


 


Balance 650 ml 1750 ml 














JENNIFER HATCH MD Mar 10, 2017 09:34

## 2019-07-02 ENCOUNTER — HOSPITAL ENCOUNTER (OUTPATIENT)
Dept: HOSPITAL 61 - MAMMO | Age: 74
Discharge: HOME | End: 2019-07-02
Attending: FAMILY MEDICINE
Payer: MEDICARE

## 2019-07-02 DIAGNOSIS — N63.20: ICD-10-CM

## 2019-07-02 DIAGNOSIS — Z12.31: Primary | ICD-10-CM

## 2019-07-02 PROCEDURE — 77063 BREAST TOMOSYNTHESIS BI: CPT

## 2019-07-02 PROCEDURE — 77067 SCR MAMMO BI INCL CAD: CPT

## 2019-07-03 NOTE — RAD
DATE: 07/02/2019



EXAM: MAMMO CELINA SCREENING BILATERAL



HISTORY: Routine evaluation.  Baseline exam.



COMPARISON: None



This study was interpreted with the benefit of Computerized Aided Detection

(CAD).





Breast Density: HETERO The breast parenchyma is heterogenously dense, which

could reduce sensitivity of mammography. Breast parenchyma level C.





FINDINGS: A well-circumscribed mass measuring 0.8 cm diameter is present at

the inner aspect of the posterior left breast 10 cm from the nipple.  No

suspicious calcifications or distortion.  





IMPRESSION: Limited ultrasound of the left breast is recommended at the 9:00 C

position.







BI-RADS CATEGORY: 0 INCOMPLETE: NEEDS ADDITIONAL IMAGING EVALUATION AND/OR

PRIOR MAMMOGRAMS FOR COMPARISON.



RECOMMENDED FOLLOW-UP: ADD ADDITIONAL IMAGING



PQRS compliance statement: Patient information was entered into a reminder

system with a target due date pending ultrasound results for the next

mammogram.



Mammography is a sensitive method for finding small breast cancers, but it

does not detect them all and is not a substitute for careful clinical

examination.  A negative mammogram does not negate a clinically suspicious

finding and should not result in delay in biopsying a clinically suspicious

abnormality.



"Our facility is accredited by the [employer] Mammography

Program."

## 2019-07-16 ENCOUNTER — HOSPITAL ENCOUNTER (OUTPATIENT)
Dept: HOSPITAL 61 - US | Age: 74
Discharge: HOME | End: 2019-07-16
Attending: FAMILY MEDICINE
Payer: MEDICARE

## 2019-07-16 DIAGNOSIS — N63.22: Primary | ICD-10-CM

## 2019-07-16 PROCEDURE — 76641 ULTRASOUND BREAST COMPLETE: CPT

## 2019-07-16 NOTE — RAD
Indication: Left breast callback.

 

TECHNIQUE: Limited left breast ultrasound.

 

COMPARISON: Most recent mammogram from 7/2/2019.

 

FINDINGS:

At 9:00 position approximately 15 cm from the nipple there is an 

oval-shaped hyperechoic mass measuring 0.8 x 0.4 x 0.7 cm without internal

vascularity. The mass is wider than taller without posterior enhancement 

or shadowing.

 

IMPRESSION:

Left breast mass corresponding to mammographically seen abnormality. While

this may represent a small lipoma or other lesion of benign etiology, a 

malignancy is not ruled out.

 

BI-RADS 4: Suspicious finding. Ultrasound-guided biopsy recommended.

 

Findings were relayed to 's answering service on 7/16/2019 at 8:58 

AM. Findings also discussed with patient in person. Depending on patient's

preference a short-term ultrasound follow-up is also an option.

 

Electronically signed by: Adan Sanchez DO (7/16/2019 8:58 AM) [facility]

## 2019-08-01 ENCOUNTER — HOSPITAL ENCOUNTER (OUTPATIENT)
Dept: HOSPITAL 61 - US | Age: 74
Discharge: HOME | End: 2019-08-01
Attending: FAMILY MEDICINE
Payer: MEDICARE

## 2019-08-01 DIAGNOSIS — N60.32: Primary | ICD-10-CM

## 2019-08-01 PROCEDURE — 19083 BX BREAST 1ST LESION US IMAG: CPT

## 2019-08-01 PROCEDURE — 88305 TISSUE EXAM BY PATHOLOGIST: CPT

## 2019-08-01 PROCEDURE — 19084 BX BREAST ADD LESION US IMAG: CPT

## 2019-08-01 PROCEDURE — 76942 ECHO GUIDE FOR BIOPSY: CPT

## 2019-08-01 NOTE — RAD
Ultrasound-guided core biopsy of the left breast

 

History: Left breast nodule. This is located at the 9:00 position.

 

Procedure: The patient provided both verbal and written consent after the 

procedure and possible complications including bleeding and infection were

explained. A timeout was performed which confirmed the name of the patient

and date of birth and type of procedure and side of the procedure. 

Sonography of the left breast today demonstrates a hypoechoic nodule 

measuring 9 mm in size of the 10:00 position 10 cm from the nipple. This 

is different than the hyperechoic nodule seen at the 9:00 position 15 cm 

from the nipple noted previously. The hypoechoic nodule seen today most 

likely represents the mammographic finding. The hyperechoic nodule most 

likely represents a small focus of fat. Therefore, the hypoechoic nodule 

will be biopsied today. Appropriate skin aminah on the left breast was made 

using ultrasound guidance. The left breast was prepped and draped in the 

usual sterile fashion with ChloraPrep. Total of 6 cc 1% lidocaine was 

utilized for local anesthesia. Using sterile technique and ultrasound 

guidance, a small skin nick was made and a 20-gauge Temno biopsy of the 

hypoechoic nodule was obtained. However, the Temno needle appeared to push

the nodule away and therefore definite biopsy of this nodule was 

indeterminate. Therefore, a 14-gauge Bard needle was utilized and placed 

along the edge of the nodule. 2 separate biopsies were obtained. After the

second biopsy, bleeding through the biopsy incision was noted. The left 

breast started to swell. Therefore, manual pressure was applied 

immediately for 10 minutes. Sonographic scanning demonstrates a hematoma 

and arterial flow within the region of the hematoma in the region of the 

biopsy. Pressure was applied for another 65 minutes and bleeding was noted

on and off with persistent arterial flow still noted within the bleeding 

site. Following this, interventional radiology and general surgery was 

consulted. The patient's primary care physician Dr. Tommy Martinez was 

consulted and it was decided that the patient will be admitted to the 

hospital. Prior to this, the patient was sent to the emergency room for 

placement of IV and implementation of IV fluids and further ER evaluation 

in attempts to control the bleeding. Patient will be admitted to hospital 

for further sand bag compression and monitoring and pain control. The 3 

biopsies that were obtained were placed into formalin and sent to 

pathology for further evaluation. As a result of the complication of 

bleeding from the biopsy, no biopsy clip marker was placed. Biopsies 

results are pending and she will be followed by the patient's physician.

 

Spot images were performed.

 

IMPRESSION: Ultrasound-guided core biopsy of the hypoechoic nodule of the 

left breast at the 10:00 position 10 cm from the nipple was performed. 

Biopsy results are pending. Follow-up will be with the patient's 

physician. The patient experienced arterial bleeding complication as a 

result of the biopsy and therefore the biopsy had to be stopped at this 

point  without placement of a biopsy clip marker.

 

Electronically signed by: Gianfranco Ribera MD (8/1/2019 1:53 PM) [facility]

## 2019-08-02 NOTE — PATHOLOGY
[facility] Accession Number: [id number]

.                                                                01

Material submitted:                                        .

breast - LEFT BREAST MASS 10:00 10 CMFN. Modifiers: left

.                                                                01

Clinical history:                                          .

Left breast mass

.                                                                02

**********************************************************************

Diagnosis:

Breast and fibroadipose tissue, left breast mass 10:00 needle biopsies:

- Focal stromal fibrosis.  See comment.

LBQ/08/02/2019

**********************************************************************

.                                                                02

Comment:

One of the needle biopsy segments (A2) shows a focus of confluent stromal

fibrosis which is sparsely cellular and contains only a few small atrophic

ducts.  The remaining biopsy segments consist of fibroadipose tissue.

There is no evidence of malignancy in the submitted material.  I am not

certain as to whether or not the biopsy is truly representative of the

lesion.  Correlate with mammographic findings.

(JPM/db; 8/02/2019)

.                                                                02

Electronically signed:                                     .

Solomon Willson MD, Pathologist

NPI- [id number]

.                                                                01

Gross description:                                         .

The specimen is received in formalin, labeled "Miriam Elkins, 10:00 10 cm

from nipple, left".  Received are multiple needle cores of fibrofatty

tissue measuring 0.8 x 0.6 x 0.2 cm in aggregate dimensions.  The specimen

is submitted entirely in cassettes A1 through A3.  The cold ischemic time

is 3 hours and 10 minutes.  The total formalin fixation time is 9 hours

and 10 minutes.

(CAA; 8/1/2019)

QAC/QAC

.                                                                02

Pathologist provided ICD-10:

N60.32

.                                                                02

CPT                                                        .

244261

Specimen Comment: A courtesy copy of this report has been sent to

Specimen Comment: [phone], [phone].

Specimen Comment: Report sent to  and 

***Performed at:  01

   [facility]

   [address], [place]  [id number]

   MD Blade Richard MD Phone:  [phone]

***Performed at:  02

   [facility]

   [address] [place], [place]  [id number]

   MD Solomon Willson MD Phone:  [phone]